# Patient Record
Sex: MALE | Race: BLACK OR AFRICAN AMERICAN
[De-identification: names, ages, dates, MRNs, and addresses within clinical notes are randomized per-mention and may not be internally consistent; named-entity substitution may affect disease eponyms.]

---

## 2017-02-06 ENCOUNTER — HOSPITAL ENCOUNTER (EMERGENCY)
Dept: HOSPITAL 74 - JER | Age: 22
Discharge: HOME | End: 2017-02-06
Payer: COMMERCIAL

## 2017-02-06 VITALS — TEMPERATURE: 97.9 F

## 2017-02-06 VITALS — SYSTOLIC BLOOD PRESSURE: 132 MMHG | HEART RATE: 58 BPM | DIASTOLIC BLOOD PRESSURE: 75 MMHG

## 2017-02-06 VITALS — BODY MASS INDEX: 16.7 KG/M2

## 2017-02-06 DIAGNOSIS — K52.9: Primary | ICD-10-CM

## 2017-02-06 LAB
ALBUMIN SERPL-MCNC: 4.5 G/DL (ref 3.4–5)
ALP SERPL-CCNC: 58 U/L (ref 45–117)
ALT SERPL-CCNC: 21 U/L (ref 12–78)
ANION GAP SERPL CALC-SCNC: 12 MMOL/L (ref 8–16)
AST SERPL-CCNC: 16 U/L (ref 15–37)
BASOPHILS # BLD: 0.7 % (ref 0–2)
BILIRUB SERPL-MCNC: 0.5 MG/DL (ref 0.2–1)
CALCIUM SERPL-MCNC: 9.6 MG/DL (ref 8.5–10.1)
CO2 SERPL-SCNC: 27 MMOL/L (ref 21–32)
CREAT SERPL-MCNC: 0.9 MG/DL (ref 0.7–1.3)
DEPRECATED RDW RBC AUTO: 13.9 % (ref 11.9–15.9)
EOSINOPHIL # BLD: 0.3 % (ref 0–4.5)
GLUCOSE SERPL-MCNC: 97 MG/DL (ref 74–106)
MCH RBC QN AUTO: 30.3 PG (ref 25.7–33.7)
MCHC RBC AUTO-ENTMCNC: 33.6 G/DL (ref 32–35.9)
MCV RBC: 90.3 FL (ref 80–96)
NEUTROPHILS # BLD: 85 % (ref 42.8–82.8)
PLATELET # BLD AUTO: 187 K/MM3 (ref 134–434)
PMV BLD: 10.8 FL (ref 7.5–11.1)
PROT SERPL-MCNC: 7.7 G/DL (ref 6.4–8.2)
WBC # BLD AUTO: 10 K/MM3 (ref 4–10)

## 2017-02-06 PROCEDURE — 3E0337Z INTRODUCTION OF ELECTROLYTIC AND WATER BALANCE SUBSTANCE INTO PERIPHERAL VEIN, PERCUTANEOUS APPROACH: ICD-10-PCS

## 2017-02-06 PROCEDURE — 3E033GC INTRODUCTION OF OTHER THERAPEUTIC SUBSTANCE INTO PERIPHERAL VEIN, PERCUTANEOUS APPROACH: ICD-10-PCS

## 2017-02-06 NOTE — PDOC
History of Present Illness





- History of Present Illness


Initial Comments: 





02/06/17 18:39


The patient is a 22 year old male, with a significant past medical history of 

asthma, who presents to the emergency department with nausea, vomiting, and 

diarrhea since this morning. The patient reports over 10 episodes of vomit 

today. He denies noticing blood in his vomit. He also reports at least 5 

episodes of watery, brown diarrhea. He denies any sick contacts. 





He denies chest pain, shortness of breath, headache and dizziness. He denies 

fever, chills, and constipation. He denies dysuria, frequency, urgency and 

hematuria. 





Allergies: NKDA


Past surgical history: appendectomy (2016), benign brain tumor removal (2008)


Social history: denies toxic habits


PCP - Dr. Christina Rojas





<Sade Chandra - Last Filed: 02/06/17 18:47>





<Latha Cristobal - Last Filed: 02/06/17 20:46>





- General


Chief Complaint: Pain, Acute


Stated Complaint: NAUSEA/VOMITING


Time Seen by Provider: 02/06/17 18:01





Past History





<Sade Chandra - Last Filed: 02/06/17 18:47>





- Past Medical History


Asthma: Yes


Thyroid Disease: No





- Surgical History


Appendectomy: Yes





- Psycho/Social/Smoking Cessation Hx


Anxiety: No


Suicidal Ideation: No


Smoking History: Never smoked


Have you smoked in the past 12 months: No


Hx Alcohol Use: No


Drug/Substance Use Hx: No


Substance Use Type: None





<Latha Cristobal - Last Filed: 02/06/17 20:46>





- Past Medical History


Allergies/Adverse Reactions: 


 Allergies











Allergy/AdvReac Type Severity Reaction Status Date / Time


 


cefaclor [From [facility]] Allergy   Verified 02/06/17 17:40











Home Medications: 


Ambulatory Orders





NK [No Known Home Medication]  02/06/17 











**Review of Systems





- Review of Systems


Able to Perform ROS?: Yes


Comments:: 





02/06/17 18:39





CONSTITUTIONAL: 


Absent: fever, chills, diaphoresis, generalized weakness, malaise, loss of 

appetite


HEENT: 


Absent: rhinorrhea, nasal congestion, throat pain, throat swelling, difficulty 

swallowing, mouth swelling, ear pain, eye pain, visual Changes


CARDIOVASCULAR: 


Absent: chest pain, syncope, palpitations, irregular heart rate, lightheadedness

, peripheral edema


RESPIRATORY: 


Absent: cough, shortness of breath, dyspnea with exertion, orthopnea, wheezing, 

stridor, hemoptysis


GASTROINTESTINAL:


(+) abdominal pain, nausea, vomiting, diarrhea. Absent: abdominal distension, 

constipation, melena, hematochezia


GENITOURINARY: 


Absent: dysuria, frequency, urgency, hesitancy, hematuria, flank pain, genital 

pain


MUSCULOSKELETAL: 


Absent: myalgia, arthralgia, joint swelling


SKIN: 


Absent: rash, itching, pallor


HEMATOLOGIC/IMMUNOLOGIC: 


Absent: easy bleeding, easy bruising, lymphadenopathy, frequent infections


ENDOCRINE:


Absent: unexplained weight gain, unexplained weight loss, heat intolerance, 

cold intolerance


NEUROLOGIC: 


Absent: headache, focal weakness or paresthesias, dizziness, unsteady gait, 

seizure, mental


status changes, bladder or bowel incontinence


PSYCHIATRIC: 


Absent: anxiety, depression, suicidal or homicidal ideation, hallucinations.








<Sade Chandra - Last Filed: 02/06/17 18:47>





*Physical Exam





- Vital Signs


 Last Vital Signs











Temp Pulse Resp BP Pulse Ox


 


 97.9 F   43 L  16   136/77   98 


 


 02/06/17 17:35  02/06/17 17:35  02/06/17 17:35  02/06/17 17:35  02/06/17 17:35














- Physical Exam


Comments: 


02/06/17 18:40





GENERAL:


Well developed, well nourished. Awake and alert. No acute distress.


HEENT:


(+) Dry mucous membranes. Normocephalic, atraumatic. PERRLA, EOMI. No 

conjunctival pallor. Sclera are non-icteric. Oropharynx is clear.


NECK: 


Supple. Full ROM. No JVD. Carotid pulses 2+ and symmetric, without bruits. No 

thyromegaly. No lymphadenopathy.


CARDIOVASCULAR:


(+) slightly tachycardic rate and normal rhythm. No murmurs, rubs, or gallops. 

Distal pulses are 2+ and symmetric. 


PULMONARY: 


No evidence of respiratory distress. Lungs clear to auscultation bilaterally. 

No wheezing, rales or rhonchi.


ABDOMINAL:


Non-tender. Soft. Non-distended. No rebound or guarding. No organomegaly. 

Normoactive bowel sounds. 


MUSCULOSKELETAL 


Normal range of motion at all joints. No bony deformities or tenderness. No CVA 

tenderness.


EXTREMITIES: 


No cyanosis. No clubbing. No edema. No calf tenderness.


SKIN: 


Warm and dry. Normal capillary refill. No rashes. No jaundice. 


NEUROLOGICAL: 


Alert, awake, appropriate. Cranial nerves 2-12 intact. Normoreflexic in the 

upper and lower extremities. Normal speech. Toes are down-going bilaterally. 

Gait is normal without ataxia.


PSYCHIATRIC: 


Cooperative. Good eye contact. Appropriate mood and affect.








<Sade Chandra - Last Filed: 02/06/17 18:47>





- Vital Signs


 Last Vital Signs











Temp Pulse Resp BP Pulse Ox


 


 97.9 F   43 L  16   136/77   98 


 


 02/06/17 17:35  02/06/17 17:35  02/06/17 17:35  02/06/17 17:35  02/06/17 17:35














<Latha Cristobal - Last Filed: 02/06/17 20:46>





ED Treatment Course





- LABORATORY


CBC & Chemistry Diagram: 


 02/06/17 18:50





 02/06/17 18:50





<Latha Cristobal - Last Filed: 02/06/17 20:46>





*DC/Admit/Observation/Transfer





- Attestations


Scribe Attestion: 





02/06/17 18:40





Documentation prepared by Sade Chandra, acting as medical scribe for Latha Cristobal MD





<Sade Chandra - Last Filed: 02/06/17 18:47>





<Latha Cristobal - Last Filed: 02/06/17 20:46>


Diagnosis at time of Disposition: 


 Gastroenteritis





- Discharge Dispostion


Disposition: HOME


Condition at time of disposition: Stable





- Referrals


Referrals: 


Christina Rojas MD [Primary Care Provider] - 





- Patient Instructions


Printed Discharge Instructions:  DI for Viral Gastroenteritis -- Adult


Additional Instructions: 


please advance your diet as tolerated


return for worsening symptom or high fever

## 2017-11-24 ENCOUNTER — HOSPITAL ENCOUNTER (OUTPATIENT)
Dept: HOSPITAL 74 - JER | Age: 22
Setting detail: OBSERVATION
LOS: 1 days | Discharge: LEFT BEFORE DISCHARGE | End: 2017-11-25
Attending: NURSE PRACTITIONER | Admitting: HOSPITALIST
Payer: COMMERCIAL

## 2017-11-24 VITALS — BODY MASS INDEX: 17.4 KG/M2

## 2017-11-24 DIAGNOSIS — Z88.8: ICD-10-CM

## 2017-11-24 DIAGNOSIS — R11.2: Primary | ICD-10-CM

## 2017-11-24 LAB
ALBUMIN SERPL-MCNC: 4.1 G/DL (ref 3.4–5)
ALP SERPL-CCNC: 58 U/L (ref 45–117)
ALT SERPL-CCNC: 22 U/L (ref 12–78)
ANION GAP SERPL CALC-SCNC: 7 MMOL/L (ref 8–16)
AST SERPL-CCNC: 13 U/L (ref 15–37)
BASOPHILS # BLD: 0.2 % (ref 0–2)
BILIRUB SERPL-MCNC: 0.4 MG/DL (ref 0.2–1)
CALCIUM SERPL-MCNC: 8.4 MG/DL (ref 8.5–10.1)
CO2 SERPL-SCNC: 25 MMOL/L (ref 21–32)
CREAT SERPL-MCNC: 0.9 MG/DL (ref 0.7–1.3)
DEPRECATED RDW RBC AUTO: 13.6 % (ref 11.9–15.9)
EOSINOPHIL # BLD: 0.1 % (ref 0–4.5)
GLUCOSE SERPL-MCNC: 97 MG/DL (ref 74–106)
MCH RBC QN AUTO: 30.7 PG (ref 25.7–33.7)
MCHC RBC AUTO-ENTMCNC: 33.7 G/DL (ref 32–35.9)
MCV RBC: 91.2 FL (ref 80–96)
NEUTROPHILS # BLD: 87.8 % (ref 42.8–82.8)
PLATELET # BLD AUTO: 180 K/MM3 (ref 134–434)
PMV BLD: 10 FL (ref 7.5–11.1)
PROT SERPL-MCNC: 7 G/DL (ref 6.4–8.2)
WBC # BLD AUTO: 12 K/MM3 (ref 4–10)

## 2017-11-24 PROCEDURE — G0378 HOSPITAL OBSERVATION PER HR: HCPCS

## 2017-11-24 PROCEDURE — 3E0337Z INTRODUCTION OF ELECTROLYTIC AND WATER BALANCE SUBSTANCE INTO PERIPHERAL VEIN, PERCUTANEOUS APPROACH: ICD-10-PCS | Performed by: NURSE PRACTITIONER

## 2017-11-24 PROCEDURE — 3E033GC INTRODUCTION OF OTHER THERAPEUTIC SUBSTANCE INTO PERIPHERAL VEIN, PERCUTANEOUS APPROACH: ICD-10-PCS | Performed by: NURSE PRACTITIONER

## 2017-11-24 NOTE — PN
Teaching Attending Note


Name of Resident: Ila Vazquez





ATTENDING PHYSICIAN STATEMENT





I saw and evaluated the patient.


I reviewed the resident's note and discussed the case with the resident.


I agree with the resident's findings and plan as documented.








SUBJECTIVE:








OBJECTIVE:








ASSESSMENT AND PLAN:


this is a 22 yr old male without any medical hx presented intractable vomiting, 

without any fever, chills. the patient stated he was in a good state of health 

until this morning 








plan:


admit the patient to obs 


ivf hydration 


anti-emitics


monitor QTC while on antiemetic

## 2017-11-24 NOTE — PDOC
Attending Attestation





- Resident


Resident Name: Tae Shin





- ED Attending Attestation


I have performed the following: I have examined & evaluated the patient, The 

case was reviewed & discussed with the resident, I agree w/resident's findings 

& plan





- HPI


HPI: 





11/24/17 16:40


21-year-old male with a history of recurrent vomiting over the past year.  

Patient states he smokes marijuana daily.  He now comes in complaining of 10 

episodes of nonbilious, nonbloody vomiting today and 2 episodes of minor 

diarrhea.  He denies any abdominal pain.  He denies any fever.





- Physicial Exam


PE: 





11/24/17 16:41


On examination, the patient complains of nausea.  His abdomen is soft, nontender

, without guarding or rebound.  There is no flank tenderness.  Vital signs are 

normal with the exception of bradycardia, which is chronic for him based on 

prior visits.





- Medical Decision Making





11/24/17 16:41


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  

Rule out other pathology.  Laboratory studies ordered.  Symptomatic therapy 

with antiemetics and fluids ordered.  Patient will be reevaluated post lab 

results with repeat abdominal examination.

## 2017-11-24 NOTE — PDOC
History of Present Illness





- General


Chief Complaint: Vomiting/Diarrhea


Stated Complaint: VOMITING


Time Seen by Provider: 11/24/17 14:27





- History of Present Illness


Initial Comments: 





11/24/17 15:08


21 yo M with h/o recurrent emesis who presents with vomitting. Patient reports 

acute onset of N/V this AM with 10 + episode of non biliary non bloody emesis 

this AM. Also endorses 3 + episodes of loose watery stools this AM with no 

blood or dark stools visualized. No abdominal pain, postprandial pain, but 

endorses decreased appetite.Symptoms nor relived with hot showers. Denies fevers

/chills dysuria, hematuria, abdominal pain, chest pain, SOB, lightheadedness, 

LOC. Endorses recreational marijuana use. Denies other illicit drug use. Denies 

alcohol use. Denies recent travel, hiking, camping, or recent sick contacts. 

Denies illicit drug use. H/o prior ED visits for N/V, Abdominal pain. States 

that he has been seen by GI in past.











Past History





- Past Medical History


Allergies/Adverse Reactions: 


 Allergies











Allergy/AdvReac Type Severity Reaction Status Date / Time


 


cefaclor [From [facility]] Allergy   Verified 02/06/17 17:40











Home Medications: 


Ambulatory Orders





NK [No Known Home Medication]  02/06/17 








Asthma: Yes


Thyroid Disease: No





- Surgical History


Appendectomy: Yes





- Suicide/Smoking/Psychosocial Hx


Smoking History: Never smoked


Have you smoked in the past 12 months: No


Information on smoking cessation initiated: No


Hx Alcohol Use: No


Drug/Substance Use Hx: No


Substance Use Type: None





**Review of Systems





- Review of Systems


Comments:: 





11/24/17 15:11


GENERAL/CONSTITUTIONAL: No fever or chills. No weakness.


HEAD, EYES, EARS, NOSE AND THROAT: No change in vision. No ear pain or 

discharge. No sore throat.-


CARDIOVASCULAR: No chest pain or shortness of breath


RESPIRATORY: No cough, wheezing, or hemoptysis.


GASTROINTESTINAL: +  nausea, vomiting, and diarrhea. No constipation.


GENITOURINARY: No dysuria, frequency, or change in urination.


MUSCULOSKELETAL: No joint or muscle swelling or pain. No neck or back pain.


SKIN: No rash


NEUROLOGIC: No headache, vertigo, loss of consciousness, or change in strength/

sensation.


ENDOCRINE: No increased thirst. No abnormal weight change


HEMATOLOGIC/LYMPHATIC: No anemia, easy bleeding, or history of blood clots.


ALLERGIC/IMMUNOLOGIC: No hives or skin allergy.











*Physical Exam





- Vital Signs


 Last Vital Signs











Temp Pulse Resp BP Pulse Ox


 


 98.3 F   45 L  20   134/74   100 


 


 11/24/17 13:45  11/24/17 13:45  11/24/17 13:45  11/24/17 13:45  11/24/17 13:45














- Physical Exam


Comments: 





11/24/17 15:12


GENERAL: Awake, alert, and fully oriented, in no acute distress


HEAD: No signs of trauma, normocephalic, atraumatic 


EYES: PERRLA, EOMI, sclera anicteric, conjunctiva clear


ENT: Auricles normal inspection, hearing grossly normal, nares patent, 

oropharynx clear without


exudates. Moist mucosa


NECK: Normal ROM, supple, no lymphadenopathy, JVD, or masses


LUNGS: No distress, speaks full sentences, clear to auscultation bilaterally 


HEART: Regular rate and rhythm, normal S1 and S2, no murmurs, rubs or gallops, 

peripheral pulses normal and equal bilaterally. 


ABDOMEN: Soft, nontender, normoactive bowel sounds.  No guarding, no rebound.  

No masses. Neg CVA ttp. Neg suprapubic ttp.


EXTREMITIES : Normal inspection, Normal range of motion, no edema.  No clubbing 

or cyanosis. 


SKIN: Warm, Dry, normal turgor, no rashes or lesions noted. 











ED Treatment Course





- LABORATORY


CBC & Chemistry Diagram: 


 11/24/17 16:40





 11/24/17 16:40





Medical Decision Making





- Medical Decision Making





11/24/17 16:18


21 yo M with h/o recurrent emesis who presents with acute onset of N/V this AM 

with 10 + episode of non biliary non bloody emesis this AM. Also endorses 3 + 

episodes of loose watery stools this AM with no blood or dark stools 

visualized. No abdominal pain, postprandial pain, but endorses decreased 

appetite. Denies fevers/chills dysuria, hematuria, abdominal pain, chest pain, 

SOB, lightheadedness, LOC. Physical exam benign with mild bradycardia ~P 47. 

Endorses recreational marijuana use. Will obtain basic labs to assess for 

electrolyte imbalance and infectious etiology or GI related cause to illness. S/

s most likely 2/2 to marijuana induced cyclical vomiting. There is low 

suspicion for more serious pathology given benign physical exam, absence of 

abdominal pain. Do not suspect PUD, mesenteric ischemia, IBD. 





ED Course: 


CBC, CMP, Lipase, UA, EKG, urine tox


11/24/17 16:32


NS 1 L, Regaln 10 mg, Zofran 4 mg, Diphendhyrdamie 25 mg. 





11/24/17 18:25


Pt. no longer vomiting, but will PO challenge.   





11/24/17 19:00


Continues to vomit.Will give compazine and microblog admitting hospitalist. 


11/24/17 20:11


Admit to Dr. Aguirre





*DC/Admit/Observation/Transfer


Diagnosis at time of Disposition: 


Nausea & vomiting


Qualifiers:


 Vomiting type: unspecified Vomiting Intractability: non-intractable Qualified 

Code(s): R11.2 - Nausea with vomiting, unspecified








- Discharge Dispostion


Admit: Yes





- Referrals





- Patient Instructions





- Post Discharge Activity

## 2017-11-24 NOTE — HP
CHIEF COMPLAINT: nausea, vomiting





PCP: none





HISTORY OF PRESENT ILLNESS:


22 yr old with pmhx of "chrondroma" presents with nausea and continuous 

vomiting since 5am this morning associated with diffuse abdominal pain. He at 

Thanksgiving dinner Thursday night without difficulty, including ham, potatoes, 

dairy products. denies raw or new food. also has 3 nonbloody watery bowel 

movements early this morning with no repeat BMs. abdominal pain is worse with 

palpation, not related to movement, continuous, nonradiation.


No others family members with similar symptoms. 


He completed 7 day course of amoxicillin 2wks ago for tooth infection.





ER course was notable for:


(1) reglan + compezine


(2) LUQ sono without gallstones/cbd








Recent Travel: spent one week in [place] two months ago, denies any bug bites, raw/

new foods.





PAST MEDICAL HISTORY:


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

, was followed with serial head CT's until 2 years ago when he was cleared and 

told no further follow-up was necessary


as per chart patient review patient had an appendectomy in 4/2016 at [facility]





PAST SURGICAL HISTORY:


"transphenoidal" surgery at age 15





Social History:


Smoking: 3-4 cigs/day occasionally since age 15


Alcohol: denies


Drugs: denied to this interviewer, however as per ED, he admits to smoking 

marijuana regularily and recently





Family History: paternal great-grandmother with unknown type of cancer


Allergies





cefaclor [From Ceclor] Allergy (Verified 02/06/17 17:40)


 








HOME MEDICATIONS:


 Home Medications











 Medication  Instructions  Recorded


 


NK [No Known Home Medication]  02/06/17








REVIEW OF SYSTEMS


CONSTITUTIONAL: 


Present: unintentional weight loss


Absent:  fever, chills, diaphoresis, generalized weakness, malaise, loss of 

appetite


HEENT: 


Absent:  rhinorrhea, nasal congestion, throat pain, throat swelling, difficulty 

swallowing, mouth swelling, ear pain, eye pain, visual changes


CARDIOVASCULAR: 


Absent: chest pain, syncope, palpitations, irregular heart rate, lightheadedness

, peripheral edema


RESPIRATORY: 


Absent: cough, shortness of breath, dyspnea with exertion, orthopnea, wheezing, 

stridor, hemoptysis


GASTROINTESTINAL:


Present: abdominal pain, nausea, vomiting, diarrhea,


Absent: abdominal distension,  constipation, melena, hematochezia


GENITOURINARY: 


Absent: dysuria, frequency, urgency, hesitancy, hematuria, flank pain, genital 

pain


MUSCULOSKELETAL: 


Absent: myalgia, arthralgia, joint swelling, back pain, neck pain


SKIN: 


Absent: rash, itching, pallor


HEMATOLOGIC/IMMUNOLOGIC: 


Absent: easy bleeding, easy bruising, lymphadenopathy, frequent infections


ENDOCRINE:


Absent: unexplained weight gain, unexplained weight loss, heat intolerance, 

cold intolerance


NEUROLOGIC: 


Absent: headache, focal weakness or paresthesias, dizziness, unsteady gait, 

seizure, mental status changes, bladder or bowel incontinence











PHYSICAL EXAMINATION


 Vital Signs - 24 hr











  11/24/17





  13:45


 


Temperature 98.3 F


 


Pulse Rate 45 L


 


Respiratory 20





Rate 


 


Blood Pressure 134/74


 


O2 Sat by Pulse 100





Oximetry (%) 











GENERAL: Awake, alert, and fully oriented, in no acute distress.


HEAD: Normal with no signs of trauma.


EYES: Pupils equal, round and reactive to light, extraocular movements intact, 

sclera anicteric, conjunctiva clear. No lid lag.


EARS, NOSE, THROAT: Ears normal, nares patent, oropharynx clear without 

exudates. Moist mucous membranes.


NECK: Normal range of motion, supple without lymphadenopathy, JVD, or masses.


LUNGS: Breath sounds equal, clear to auscultation bilaterally. No wheezes, and 

no crackles. No accessory muscle use.


HEART: Regular rate and rhythm, normal S1 and S2 without murmur, rub or gallop.


ABDOMEN: Soft, diffusely tender with light palpation, not distended, 

normoactive bowel sounds, +guarding, no masses. 


MUSCULOSKELETAL: Normal range of motion at all joints. No bony deformities or 

tenderness. No CVA tenderness.


UPPER EXTREMITIES: 2+ radial pulses, warm, well-perfused. No cyanosis. No 

clubbing. No peripheral edema.


LOWER EXTREMITIES: 2+ DP pulses, warm, well-perfused. No calf tenderness. No 

peripheral edema. 


NEUROLOGICAL:  Cranial nerves II-XII intact. Normal speech. facial symmetry, 5/

5 hand  and plantar flexion


PSYCHIATRIC: agitated, uncomfortable, 


SKIN: Warm, dry, normal turgor, no rashes or lesions noted, normal capillary 

refill. 


 Laboratory Results - last 24 hr











  11/24/17 11/24/17 11/24/17





  16:40 16:40 16:58


 


WBC  12.0 H  


 


RBC  4.43  


 


Hgb  13.6  


 


Hct  40.4  


 


MCV  91.2  


 


MCH  30.7  


 


MCHC  33.7  


 


RDW  13.6  


 


Plt Count  180  


 


MPV  10.0  


 


Neutrophils %  87.8 H  


 


Lymphocytes %  7.0 L D  


 


Monocytes %  4.9  


 


Eosinophils %  0.1  


 


Basophils %  0.2  


 


Sodium   139 


 


Potassium   4.1 


 


Chloride   107 


 


Carbon Dioxide   25 


 


Anion Gap   7 L 


 


BUN   11  D 


 


Creatinine   0.9 


 


Creat Clearance w eGFR   > 60 


 


Random Glucose   97 


 


Calcium   8.4 L 


 


Total Bilirubin   0.4 


 


AST   13 L 


 


ALT   22 


 


Alkaline Phosphatase   58 


 


Total Protein   7.0 


 


Albumin   4.1 


 


Lipase    123











ASSESSMENT/PLAN:


22 yr old man presents with intractable nausea and vomiting with several 

episodes in the ED placed on further evaluation.


as per chart review patient has been seen in ed for repeat episodes of cyclic 

vomiting, differential includes secondary to marijuana use - urine toxicology 

pending to r/o other substance use withdrawal as cause of vomiting, 

gastroenteritis. 





#vomiting


- npo while vomiting, clear liquid po challenge in the AM


- repeat EKG with receiving Qtc prolonging anti-emetics, 


- LUQ u/s negative for liver pathology, cholecystitis/cholelithiasis as cause


- IVF NS @150cc/hr


- repeat electrolytes to replete if decreased due to vomiting





#DVT - anticipate short stay, encourage ambulation, low risk


#Diet - npoo overnight with clear liquid challenge in the AM


#Dispo: cna likely discharge if vomiting resolves and patient can tolerate po








Visit type





- Emergency Visit


Emergency Visit: Yes


ED Registration Date: 11/24/17


Care time: The patient presented to the Emergency Department on the above date 

and was hospitalized for further evaluation of their emergent condition.





- New Patient


This patient is new to me today: Yes


Date on this admission: 11/24/17





- Critical Care


Critical Care patient: No

## 2017-11-25 VITALS — SYSTOLIC BLOOD PRESSURE: 113 MMHG | HEART RATE: 47 BPM | DIASTOLIC BLOOD PRESSURE: 48 MMHG | TEMPERATURE: 99.3 F

## 2017-11-25 LAB
ALBUMIN SERPL-MCNC: 3.6 G/DL (ref 3.4–5)
ALP SERPL-CCNC: 52 U/L (ref 45–117)
ALT SERPL-CCNC: 18 U/L (ref 12–78)
ANION GAP SERPL CALC-SCNC: 10 MMOL/L (ref 8–16)
APPEARANCE UR: CLEAR
AST SERPL-CCNC: 12 U/L (ref 15–37)
BASOPHILS # BLD: 0.4 % (ref 0–2)
BILIRUB SERPL-MCNC: 1.2 MG/DL (ref 0.2–1)
BILIRUB UR STRIP.AUTO-MCNC: NEGATIVE MG/DL
CALCIUM SERPL-MCNC: 8 MG/DL (ref 8.5–10.1)
CO2 SERPL-SCNC: 22 MMOL/L (ref 21–32)
COLOR UR: (no result)
CREAT SERPL-MCNC: 0.8 MG/DL (ref 0.7–1.3)
DEPRECATED RDW RBC AUTO: 13.7 % (ref 11.9–15.9)
EOSINOPHIL # BLD: 0.8 % (ref 0–4.5)
GLUCOSE SERPL-MCNC: 84 MG/DL (ref 74–106)
KETONES UR QL STRIP: (no result)
MAGNESIUM SERPL-MCNC: 1.8 MG/DL (ref 1.8–2.4)
MCH RBC QN AUTO: 30.7 PG (ref 25.7–33.7)
MCHC RBC AUTO-ENTMCNC: 33.7 G/DL (ref 32–35.9)
MCV RBC: 91.1 FL (ref 80–96)
NEUTROPHILS # BLD: 65.9 % (ref 42.8–82.8)
NITRITE UR QL STRIP: NEGATIVE
PH UR: 6 [PH] (ref 5–8)
PHOSPHATE SERPL-MCNC: 2.9 MG/DL (ref 2.5–4.9)
PLATELET # BLD AUTO: 180 K/MM3 (ref 134–434)
PMV BLD: 10 FL (ref 7.5–11.1)
PROT SERPL-MCNC: 6.3 G/DL (ref 6.4–8.2)
PROT UR QL STRIP: NEGATIVE
PROT UR QL STRIP: NEGATIVE
RBC # UR STRIP: NEGATIVE /UL
SP GR UR: 1.01 (ref 1–1.03)
URINE MARIJUANA THC: POSITIVE NG/ML
UROBILINOGEN UR STRIP-MCNC: NEGATIVE MG/DL (ref 0.2–1)
WBC # BLD AUTO: 12.1 K/MM3 (ref 4–10)

## 2017-11-25 NOTE — DS
Physical Examination


Vital Signs: 


 Vital Signs











Temperature  99.3 F   11/25/17 02:02


 


Pulse Rate  47 L  11/25/17 02:02


 


Respiratory Rate  20   11/25/17 02:02


 


Blood Pressure  113/48   11/25/17 02:02


 


O2 Sat by Pulse Oximetry (%)  97   11/25/17 04:09











Labs: 


 CBC, BMP





 11/25/17 06:38 











Discharge Summary


Reason For Visit: VOMITING





- Instructions


Disposition: AGAINST MEDICAL ADVICE





- Home Medications


Comprehensive Discharge Medication List: 


Ambulatory Orders





NK [No Known Home Medication]  02/06/17

## 2017-11-26 ENCOUNTER — HOSPITAL ENCOUNTER (EMERGENCY)
Dept: HOSPITAL 74 - JER | Age: 22
Discharge: HOME | End: 2017-11-26
Payer: COMMERCIAL

## 2017-11-26 VITALS — SYSTOLIC BLOOD PRESSURE: 128 MMHG | DIASTOLIC BLOOD PRESSURE: 63 MMHG | HEART RATE: 51 BPM

## 2017-11-26 VITALS — TEMPERATURE: 98.2 F

## 2017-11-26 VITALS — BODY MASS INDEX: 17.9 KG/M2

## 2017-11-26 DIAGNOSIS — G43.A0: Primary | ICD-10-CM

## 2017-11-26 LAB
ALBUMIN SERPL-MCNC: 4.1 G/DL (ref 3.4–5)
ALP SERPL-CCNC: 57 U/L (ref 45–117)
ALT SERPL-CCNC: 21 U/L (ref 12–78)
ANION GAP SERPL CALC-SCNC: 7 MMOL/L (ref 8–16)
AST SERPL-CCNC: 9 U/L (ref 15–37)
BASOPHILS # BLD: 0.3 % (ref 0–2)
BILIRUB SERPL-MCNC: 0.3 MG/DL (ref 0.2–1)
CALCIUM SERPL-MCNC: 8.6 MG/DL (ref 8.5–10.1)
CO2 SERPL-SCNC: 27 MMOL/L (ref 21–32)
CREAT SERPL-MCNC: 0.9 MG/DL (ref 0.7–1.3)
DEPRECATED RDW RBC AUTO: 13.7 % (ref 11.9–15.9)
EOSINOPHIL # BLD: 1.5 % (ref 0–4.5)
GLUCOSE SERPL-MCNC: 88 MG/DL (ref 74–106)
MAGNESIUM SERPL-MCNC: 2.1 MG/DL (ref 1.8–2.4)
MCH RBC QN AUTO: 30.5 PG (ref 25.7–33.7)
MCHC RBC AUTO-ENTMCNC: 33.2 G/DL (ref 32–35.9)
MCV RBC: 91.9 FL (ref 80–96)
NEUTROPHILS # BLD: 73.7 % (ref 42.8–82.8)
PHOSPHATE SERPL-MCNC: 2.6 MG/DL (ref 2.5–4.9)
PLATELET # BLD AUTO: 188 K/MM3 (ref 134–434)
PMV BLD: 9.9 FL (ref 7.5–11.1)
PROT SERPL-MCNC: 7.1 G/DL (ref 6.4–8.2)
WBC # BLD AUTO: 11 K/MM3 (ref 4–10)

## 2017-11-26 PROCEDURE — 3E0337Z INTRODUCTION OF ELECTROLYTIC AND WATER BALANCE SUBSTANCE INTO PERIPHERAL VEIN, PERCUTANEOUS APPROACH: ICD-10-PCS

## 2017-11-26 PROCEDURE — 3E033NZ INTRODUCTION OF ANALGESICS, HYPNOTICS, SEDATIVES INTO PERIPHERAL VEIN, PERCUTANEOUS APPROACH: ICD-10-PCS

## 2017-11-26 PROCEDURE — 3E033GC INTRODUCTION OF OTHER THERAPEUTIC SUBSTANCE INTO PERIPHERAL VEIN, PERCUTANEOUS APPROACH: ICD-10-PCS

## 2017-11-26 NOTE — PDOC
Attending Attestation





- Resident


Resident Name: Luz Maria Monreal





- ED Attending Attestation


I have performed the following: I have examined & evaluated the patient, The 

case was reviewed & discussed with the resident, I agree w/resident's findings 

& plan, Exceptions are as noted





- HPI


HPI: 





11/26/17 14:41


23yo M hx of appendectomy, recurrent emesis, recently left AMA yesterday for 

the same p/w multiple episoides of NBNB emesis a/w non bloody, watery diarrhea 

and diffuse abd pain since this morning. Pt had similar sxs Friday into 

Saturday morning and left AMA after feeling better. Utox from yesterday 

positive for THC, pt reports smoking marijuana a few days ago. Denies headache, 

CP, SOB, weakness, dizziness, LE edema. 








- Physicial Exam


PE: 





11/26/17 14:52


GENERAL: Awake, alert, and fully oriented, appears uncomfortable


HEAD: No signs of trauma


EYES: PERRLA, EOMI, sclera anicteric, conjunctiva clear


ENT: Auricles normal inspection, hearing grossly normal, nares patent, 

oropharynx clear without exudates. Moist mucosa


NECK: Normal ROM, supple, no lymphadenopathy, JVD, or masses


LUNGS: Breath sounds equal, clear to auscultation bilaterally.  No wheezes, and 

no crackles


HEART: Regular rate and rhythm, normal S1 and S2, no murmurs, rubs or gallops


ABDOMEN: diffusely tender, worse in the epigastric area. no rebound or 

guarding. No distention


EXTREMITIES: Normal range of motion, no edema.  No clubbing or cyanosis. No 

cords, erythema, or tenderness


NEUROLOGICAL:  Normal speech, cranial nerves intact, negative pronator drift, 5/

5 strength in all 4 extremities, normal sensation to light touch in all 4 

extremities, normal cerebellar exam, normal gait, normal reflexes and tone


SKIN: Warm, Dry, normal turgor, no rashes or lesions noted.





- Medical Decision Making





11/26/17 15:01


22-year-old male with a history of recurrent emesis presents with recurrence of 

nausea, vomiting and diarrhea. Vitals are unremarkable. Exam with diffuse 

abdominal tenderness and epigastric pain. The patient reports having multiple 

CT scans for similar symptoms in the past and that they "never show anything." 

Differential includes but not limited to cyclical vomiting syndrome versus 

gastroenteritis versus colitis versus pancreatitis. Will check labs and provide 

symptomatic control. Will hold off on imaging and do serial abdominal exams 

given multiple negative CT scans in the past for similar sxs. If continues to 

be tender, will consider imaging.

## 2017-11-26 NOTE — PDOC
History of Present Illness





- General


Chief Complaint: Nausea/Vomiting


Stated Complaint: NAUSEA/VOMITING


Time Seen by Provider: 11/26/17 12:09





- History of Present Illness


Initial Comments: 





11/26/17 14:18


23yo man with h/o recurrent emesis who presents today with nausea, continuous 

NBNB emesis associated with diffuse abdominal pain and 1xepisode of non-bloody, 

watery diarrhea.  He was here on Friday for similar presentation, admitted to 

Hospitalist service, but left AMA on Saturday when symptoms improved. [facility]Q 

ultrasound (11/24) found no acute pathology (no cholethiliasis or dilated CBD). 

He returned this morning when started to have nausea and several NBNB emesis. 

He denied drug use to this interviewer, but has admitted recreational marijuana 

use to other interviewers. Urine Tox (11/25/17) was +THC. No fever or chills. 

No others family members with similar symptoms. 





Allergies: NKDA


Past surgical history: appendectomy (2016), benign brain tumor removal (2008)


Social history: denies toxic habits


PCP - Dr. Christina Rojas





11/26/17 21:41








Past History





- Past Medical History


Allergies/Adverse Reactions: 


 Allergies











Allergy/AdvReac Type Severity Reaction Status Date / Time


 


cefaclor [From [facility]] Allergy   Verified 11/26/17 12:06











Home Medications: 


Ambulatory Orders





Ondansetron [Zofran Odt -] 4 mg SL TID PRN #21 od.tablet 11/26/17 








Asthma: Yes


COPD: No


Thyroid Disease: No


Other medical history: brain tumor





- Surgical History


Appendectomy: Yes


Neurologic Surgery: Yes (Tumor Removal 2008)





- Suicide/Smoking/Psychosocial Hx


Smoking History: Never smoked


Have you smoked in the past 12 months: No


Hx Alcohol Use: No


Drug/Substance Use Hx: No


Substance Use Type: Marijuana





*Physical Exam





- Vital Signs


 Last Vital Signs











Temp Pulse Resp BP Pulse Ox


 


 99.3 F   78   18   130/60   98 


 


 11/26/17 12:04  11/26/17 12:04  11/26/17 12:04  11/26/17 12:04  11/26/17 12:04














- Physical Exam


General Appearance: Yes: Nourished, Appropriately Dressed


HEENT: positive: EOMI, STACIE, Normal ENT Inspection


Respiratory/Chest: positive: Lungs Clear, Normal Breath Sounds


Cardiovascular: positive: Regular Rhythm, Regular Rate, S1, S2


Gastrointestinal/Abdominal: positive: Soft, Other ((+)mild epigastric tenderness

)


Musculoskeletal: negative: CVA Tenderness


Extremity: positive: Normal Inspection


Integumentary: positive: Normal Color


Neurologic: positive: Fully Oriented, Alert





**Heart Score/ECG Review





- ECG Impressions


Comment:: 





11/26/17 21:24


Sinus bradycardia, rate 46, normal axis, non-specific ST elevations unchanged 

from prior 4/14/16, QTc 372.


11/26/17 22:40








ED Treatment Course





- LABORATORY


CBC & Chemistry Diagram: 


 11/26/17 12:28





 11/26/17 12:28





Medical Decision Making





- Medical Decision Making





11/26/17 15:21


23yo man with recurrent emesis, s/p appendectomy 2016, who presents with 

continuous NBNB emesis, 1x watery, non-bloody diarrhea, and diffuse abdominal 

pain. Abd/pelvis CT here in 2016 was negative for acute pathology. Ddx includues

, but not limited to cyclic vomiting syndrome 2/2 cannabis-use vs pancreatitis 

vs gastroenteritis vs colitis. 


Will treat symptomatically for now:


-IVF


-Reglan, Zofran


-IVF 1L





11/26/17 15:27


Lipase wnl. WBC of 11K. Will continue to monitor with serial abdominal exams.





11/26/17 17:17


Continues to have abdominal pain s/p Benadryl and 2mg Morphine IVP.





Will do abd/pelvic imaging to r/o acute intrabdominal pathology. Patient 

refused IV contrast because his "body reacts badly to contrast" and has vomited 

after receiving IV contrast. Patient made aware of limitations of not getting 

IV contrast.





11/26/17 21:06


As of around 5PM the patient has had no further episodes of emesis, but 

continues to c/o nausea and abdominal pain. However, on physical exam his 

abdomen is soft with only mild tenderness in epigastric region. Preliminary 

report of CT abd/pelvis is unrevealing with no e/o pancreatitis, SBO, or mass. 

Pancreatitis unlikely given normal lipase and imaging results. 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 





-Will give an 1L bolus and plan for discharge home. 





11/26/17 21:23








*DC/Admit/Observation/Transfer


Diagnosis at time of Disposition: 


 Cyclical vomiting syndrome








- Discharge Dispostion


Disposition: HOME


Condition at time of disposition: Stable


Admit: No





- Prescriptions


Prescriptions: 


Ondansetron [Zofran Odt -] 4 mg SL TID PRN #21 od.tablet


 PRN Reason: Nausea And/Or Vomiting





- Referrals


Referrals: 


Christina Rojas MD [Non Staff, Medical] - 





- Patient Instructions


Additional Instructions: 


Please follow-up with your primary care physician within 1-2 weeks. 





You may continue to have some nausea/vomiting for the next few days. Take 

Zofran as directed every 4-6 hours as needed for nausea and/or vomiting. 


Advance your diet as tolerated.





Please return to the emergency department if you have new, worsening, or 

concerning symptoms.





- Post Discharge Activity

## 2017-11-27 ENCOUNTER — HOSPITAL ENCOUNTER (INPATIENT)
Dept: HOSPITAL 74 - JER | Age: 22
LOS: 1 days | Discharge: LEFT BEFORE DISCHARGE | DRG: 241 | End: 2017-11-28
Attending: INTERNAL MEDICINE | Admitting: INTERNAL MEDICINE
Payer: COMMERCIAL

## 2017-11-27 VITALS — BODY MASS INDEX: 17.4 KG/M2

## 2017-11-27 DIAGNOSIS — R11.2: ICD-10-CM

## 2017-11-27 DIAGNOSIS — R10.84: ICD-10-CM

## 2017-11-27 DIAGNOSIS — R74.8: ICD-10-CM

## 2017-11-27 DIAGNOSIS — K29.80: ICD-10-CM

## 2017-11-27 DIAGNOSIS — K29.60: Primary | ICD-10-CM

## 2017-11-27 DIAGNOSIS — K83.8: ICD-10-CM

## 2017-11-27 DIAGNOSIS — G43.A0: ICD-10-CM

## 2017-11-27 LAB
ALBUMIN SERPL-MCNC: 4.2 G/DL (ref 3.4–5)
ALP SERPL-CCNC: 58 U/L (ref 45–117)
ALT SERPL-CCNC: 18 U/L (ref 12–78)
ANION GAP SERPL CALC-SCNC: 9 MMOL/L (ref 8–16)
APTT BLD: 34.7 SECONDS (ref 26.9–34.4)
AST SERPL-CCNC: 10 U/L (ref 15–37)
BASOPHILS # BLD: 0.3 % (ref 0–2)
BILIRUB SERPL-MCNC: 0.6 MG/DL (ref 0.2–1)
CALCIUM SERPL-MCNC: 9.4 MG/DL (ref 8.5–10.1)
CO2 SERPL-SCNC: 32 MMOL/L (ref 21–32)
CREAT SERPL-MCNC: 0.9 MG/DL (ref 0.7–1.3)
DEPRECATED RDW RBC AUTO: 13.6 % (ref 11.9–15.9)
EOSINOPHIL # BLD: 0.6 % (ref 0–4.5)
GLUCOSE SERPL-MCNC: 92 MG/DL (ref 74–106)
INR BLD: 1.27 (ref 0.82–1.09)
MAGNESIUM SERPL-MCNC: 2.1 MG/DL (ref 1.8–2.4)
MCH RBC QN AUTO: 30.4 PG (ref 25.7–33.7)
MCHC RBC AUTO-ENTMCNC: 33.8 G/DL (ref 32–35.9)
MCV RBC: 90.1 FL (ref 80–96)
NEUTROPHILS # BLD: 75.5 % (ref 42.8–82.8)
PHOSPHATE SERPL-MCNC: 2.9 MG/DL (ref 2.5–4.9)
PLATELET # BLD AUTO: 205 K/MM3 (ref 134–434)
PMV BLD: 9.7 FL (ref 7.5–11.1)
PROT SERPL-MCNC: 7.3 G/DL (ref 6.4–8.2)
PT PNL PPP: 14.3 SEC (ref 9.98–11.88)
WBC # BLD AUTO: 10.7 K/MM3 (ref 4–10)

## 2017-11-27 PROCEDURE — G0378 HOSPITAL OBSERVATION PER HR: HCPCS

## 2017-11-27 RX ADMIN — PANTOPRAZOLE SODIUM SCH MG: 40 INJECTION, POWDER, FOR SOLUTION INTRAVENOUS at 17:15

## 2017-11-27 RX ADMIN — SODIUM CHLORIDE SCH MLS/HR: 9 INJECTION, SOLUTION INTRAVENOUS at 17:15

## 2017-11-27 NOTE — CON.GI
Consult


Consult Specialty:: GI


Referred by:: ED





- History of Present Illness


History of Present Illness: 





A 22 yom with nausea, vomiting, epigastric pain and diarrhea since Friday. 

Denies eating out, ill, contacts, changed in diet, recent travels, camping, 

antibiotic use, new medications. Deneis fever, chills, joint, skin, eye 

symptoms. Deneis chronic ETOH, NSAIDs. Had similar episode 1 year ago and was 

diagnosed with acute appendicitis. Blood work on admission is significant for 

mild leukocytosis.





- Alcohol/Substance Use


Hx Alcohol Use: No





- Smoking History


Smoking history: Never smoked


Have you smoked in the past 12 months: No





Home Medications





- Allergies


Allergies/Adverse Reactions: 


 Allergies











Allergy/AdvReac Type Severity Reaction Status Date / Time


 


[facility] Allergy Severe "throat Verified 11/27/17 09:27





   swelling"  














- Home Medications


Home Medications: 


Ambulatory Orders





Ondansetron [Zofran Odt -] 4 mg SL TID PRN #21 od.tablet 11/26/17 











Family Disease History





- Family Disease History


Family History: Unremarkable (non-contributory)





Review of Systems


Findings/Remarks: 





please refer to H&P





Physical Exam-GI


Vital Signs: 


 Vital Signs











Temperature  98.4 F   11/27/17 09:27


 


Pulse Rate  79   11/27/17 13:35


 


Respiratory Rate  18   11/27/17 13:35


 


Blood Pressure  118/74   11/27/17 13:35


 


O2 Sat by Pulse Oximetry (%)  99   11/27/17 13:35











Constitutional: Yes: Well Nourished, No Distress, Calm


Eyes: Yes: Conjunctiva Clear


HENT: Yes: Atraumatic


Neck: Yes: Supple


Cardiovascular: Yes: Regular Rate and Rhythm


Respiratory: Yes: Regular


...Auscultate: Yes: Normoactive Bowel Sounds


...Palpate: Yes: Soft, Tenderness, Tenderness, Epigastium.  No: Mass


...Percussion: Yes: Other (negative frey's)


Neurological: Yes: Alert, Oriented


Labs: 


 CBC, BMP





 11/27/17 10:00 





 11/27/17 10:00 





 INR, PTT











INR  1.27  (0.82-1.09)  H  11/27/17  10:00    








 CBCD











WBC  10.7 K/mm3 (4.0-10.0)  H  11/27/17  10:00    


 


RBC  4.81 M/mm3 (4.00-5.60)   11/27/17  10:00    


 


Hgb  14.6 GM/dL (11.7-16.9)   11/27/17  10:00    


 


Hct  43.4 % (35.4-49)   11/27/17  10:00    


 


MCV  90.1 fl (80-96)   11/27/17  10:00    


 


MCHC  33.8 g/dl (32.0-35.9)   11/27/17  10:00    


 


RDW  13.6 % (11.9-15.9)   11/27/17  10:00    


 


Plt Count  205 K/MM3 (134-434)   11/27/17  10:00    


 


MPV  9.7 fl (7.5-11.1)   11/27/17  10:00    








 CMP











Sodium  139 mmol/L (136-145)   11/27/17  10:00    


 


Potassium  3.3 mmol/L (3.5-5.1)  L  11/27/17  10:00    


 


Chloride  98 mmol/L ()   11/27/17  10:00    


 


Carbon Dioxide  32 mmol/L (21-32)   11/27/17  10:00    


 


Anion Gap  9  (8-16)   11/27/17  10:00    


 


BUN  5 mg/dL (7-18)  L  11/27/17  10:00    


 


Creatinine  0.9 mg/dL (0.7-1.3)   11/27/17  10:00    


 


Creat Clearance w eGFR  > 60  (>60)   11/27/17  10:00    


 


Calcium  9.4 mg/dL (8.5-10.1)   11/27/17  10:00    


 


Total Bilirubin  0.6 mg/dL (0.2-1.0)  D 11/27/17  10:00    


 


AST  10 U/L (15-37)  L  11/27/17  10:00    


 


ALT  18 U/L (12-78)   11/27/17  10:00    


 


Alkaline Phosphatase  58 U/L ()   11/27/17  10:00    


 


Total Protein  7.3 g/dl (6.4-8.2)   11/27/17  10:00    


 


Albumin  4.2 g/dl (3.4-5.0)   11/27/17  10:00    








 Laboratory Results - last 24 hr











  11/27/17 11/27/17 11/27/17





  10:00 10:00 10:00


 


WBC  10.7 H  


 


RBC  4.81  


 


Hgb  14.6  


 


Hct  43.4  


 


MCV  90.1  


 


MCH  30.4  


 


MCHC  33.8  


 


RDW  13.6  


 


Plt Count  205  


 


MPV  9.7  


 


Neutrophils %  75.5  


 


Lymphocytes %  16.2  


 


Monocytes %  7.4  


 


Eosinophils %  0.6  


 


Basophils %  0.3  


 


PT with INR   14.30 H 


 


INR   1.27 H 


 


PTT (Actin FS)   34.7 H 


 


Sodium    139


 


Potassium    3.3 L


 


Chloride    98


 


Carbon Dioxide    32


 


Anion Gap    9


 


BUN    5 L


 


Creatinine    0.9


 


Creat Clearance w eGFR    > 60


 


Random Glucose    92


 


Calcium    9.4


 


Phosphorus    2.9


 


Magnesium    2.1


 


Total Bilirubin    0.6  D


 


AST    10 L


 


ALT    18


 


Alkaline Phosphatase    58


 


Total Protein    7.3


 


Albumin    4.2














Problem List





- Problems


(1) Abdominal pain


Code(s): R10.9 - UNSPECIFIED ABDOMINAL PAIN   


Qualifiers: 


   Abdominal location: generalized   Qualified Code(s): R10.84 - Generalized 

abdominal pain   





(2) Nausea & vomiting


Code(s): R11.2 - NAUSEA WITH VOMITING, UNSPECIFIED   


Qualifiers: 


   Vomiting type: unspecified   Vomiting Intractability: non-intractable   

Qualified Code(s): R11.2 - Nausea with vomiting, unspecified   





Assessment/Plan





Persisitent nausea, vomiting, diarrhea and epigastric pain in otherwise healthy 

22 yom. 





r/o upper GI inflammatory states (pud, gastritis, duodenies, esophagitis, 

pancreatitis, gallstone, etc.), 


Doubt, ischemia, obstruction, IBD. 


Infectious etiology is possible, however the duration and severity of the 

symptoms is not entirely consistent





NPO except medications


IVF


Stool for infectious etiologies


Lipase tonight


RUQ US


PPI IVP


Antiemetics and pain management PRN


EGD in  AM


Discussed with the patient

## 2017-11-27 NOTE — EKG
Test Reason : 

Blood Pressure : ***/*** mmHG

Vent. Rate : 046 BPM     Atrial Rate : 046 BPM

   P-R Int : 150 ms          QRS Dur : 098 ms

    QT Int : 426 ms       P-R-T Axes : 054 077 068 degrees

   QTc Int : 372 ms

 

SINUS BRADYCARDIA WITH SINUS ARRHYTHMIA

INCOMPLETE RIGHT BUNDLE BRANCH BLOCK

ST ELEVATION, CONSIDER EARLY REPOLARIZATION, PERICARDITIS, OR INJURY

ABNORMAL ECG

WHEN COMPARED WITH ECG OF 15-APR-2016 13:01,

T WAVE VARIATION

Confirmed by CHACORTA ARCE MD ([id number]) on 11/27/2017 2:07:35 PM

 

Referred By:             Confirmed By:CHACORTA ARCE MD

## 2017-11-27 NOTE — HP
Admitting History and Physical





- Primary Care Physician


PCP: Heena Martínez





- Admission


History of Present Illness: 





22 year old male, with significant past medical history of appendectomy (2016), 

who was recently admitted on 11/24/17 for recurrent nausea, vomiting, and 

diarrhea but left AMA when symptoms resolved. He was also seen in the ED on 11/ 26/17 with a markedly limited abdominal pelvic CT and was discharged with 

Zofran. The patient returns today with persistent episodes of nonbloody, 

nonbilious vomiting, diffuse abdominal pain, and loose watery stool. He states 

that he is unable to keep down the PO Zofran. The patient notes that he 

experienced similar symptoms last year around this time and was thought to have 

appendicitis; therefore, had an appendectomy. 








- Smoking History


Smoking history: Never smoked


Have you smoked in the past 12 months: No





- Alcohol/Substance Use


Hx Alcohol Use: No





Home Medications





- Allergies


Allergies/Adverse Reactions: 


 Allergies











Allergy/AdvReac Type Severity Reaction Status Date / Time


 


[facility] Allergy Severe "throat Verified 11/27/17 09:27





   swelling"  














- Home Medications


Home Medications: 


Ambulatory Orders





Ondansetron [Zofran Odt -] 4 mg SL TID PRN #21 od.tablet 11/26/17 











Physical Examination


Vital Signs: 


 Vital Signs











Temperature  98.4 F   11/27/17 09:27


 


Pulse Rate  79   11/27/17 13:35


 


Respiratory Rate  18   11/27/17 13:35


 


Blood Pressure  118/74   11/27/17 13:35


 


O2 Sat by Pulse Oximetry (%)  99   11/27/17 13:35











Constitutional: Yes: No Distress


HENT: Yes: Atraumatic


Neck: Yes: Supple


Cardiovascular: Yes: Regular Rate and Rhythm


Respiratory: Yes: CTA Bilaterally


Gastrointestinal: Yes: Normal Bowel Sounds


Extremities: Yes: WNL


Neurological: Yes: Alert, Oriented


Labs: 


 CBC, BMP





 11/27/17 10:00 





 11/27/17 10:00 











Problem List





- Problems


(1) Abdominal pain


Assessment/Plan: 


prn pain meds


prn zofran


gi consult


Code(s): R10.9 - UNSPECIFIED ABDOMINAL PAIN   


Qualifiers: 


   Abdominal location: generalized   Qualified Code(s): R10.84 - Generalized 

abdominal pain   





(2) Cyclical vomiting syndrome


Code(s): G43.A0 - CYCLICAL VOMITING, NOT INTRACTABLE   





(3) Nausea & vomiting


Code(s): R11.2 - NAUSEA WITH VOMITING, UNSPECIFIED   


Qualifiers: 


   Vomiting type: unspecified   Vomiting Intractability: non-intractable   

Qualified Code(s): R11.2 - Nausea with vomiting, unspecified   





Assessment/Plan





 Laboratory Tests











  11/27/17 11/27/17 11/27/17





  06:15 10:00 10:00


 


WBC   10.7 H 


 


RBC   4.81 


 


Hgb   14.6 


 


Hct   43.4 


 


MCV   90.1 


 


MCH   30.4 


 


MCHC   33.8 


 


RDW   13.6 


 


Plt Count   205 


 


MPV   9.7 


 


Neutrophils %   75.5 


 


Lymphocytes %   16.2 


 


Monocytes %   7.4 


 


Eosinophils %   0.6 


 


Basophils %   0.3 


 


PT with INR    14.30 H


 


INR    1.27 H


 


PTT (Actin FS)    34.7 H


 


Sodium   


 


Potassium   


 


Chloride   


 


Carbon Dioxide   


 


Anion Gap   


 


BUN   


 


Creatinine   


 


Creat Clearance w eGFR   


 


Random Glucose   


 


Calcium   


 


Phosphorus   


 


Magnesium   


 


Total Bilirubin   


 


AST   


 


ALT   


 


Alkaline Phosphatase   


 


Total Protein   


 


Albumin   


 


Lipase  376  














  11/27/17





  10:00


 


WBC 


 


RBC 


 


Hgb 


 


Hct 


 


MCV 


 


MCH 


 


MCHC 


 


RDW 


 


Plt Count 


 


MPV 


 


Neutrophils % 


 


Lymphocytes % 


 


Monocytes % 


 


Eosinophils % 


 


Basophils % 


 


PT with INR 


 


INR 


 


PTT (Actin FS) 


 


Sodium  139


 


Potassium  3.3 L


 


Chloride  98


 


Carbon Dioxide  32


 


Anion Gap  9


 


BUN  5 L


 


Creatinine  0.9


 


Creat Clearance w eGFR  > 60


 


Random Glucose  92


 


Calcium  9.4


 


Phosphorus  2.9


 


Magnesium  2.1


 


Total Bilirubin  0.6  D


 


AST  10 L


 


ALT  18


 


Alkaline Phosphatase  58


 


Total Protein  7.3


 


Albumin  4.2


 


Lipase 








Active Medications











Generic Name Dose Route Start Last Admin





  Trade Name Freq  PRN Reason Stop Dose Admin


 


Sodium Chloride  1,000 mls @ 100 mls/hr  11/27/17 17:15  11/27/17 17:15





  Normal Saline -  IV   100 mls/hr





  ASDIR ASHANTI   Administration


 


Morphine Sulfate  2 mg  11/27/17 16:57  11/27/17 17:15





  Morphine Sulfate  IVPUSH   2 mg





  Q4H PRN   Administration





  PAIN   


 


Ondansetron HCl  4 mg  11/27/17 16:43  





  Zofran Odt -  SL   





  Q6H PRN   





  NAUSEA AND/OR VOMITING   


 


Pantoprazole Sodium  40 mg  11/27/17 16:45  11/27/17 17:15





  Protonix Iv  IVPUSH   40 mg





  DAILY ASHANTI   Administration

## 2017-11-27 NOTE — PDOC
History of Present Illness





- History of Present Illness


Initial Comments: 





11/27/17 10:43


22 year old male, with significant past medical history of appendectomy (2016), 

who was recently admitted on 11/24/17 for recurrent nausea, vomiting, and 

diarrhea but left AMA when symptoms resolved. He was also seen in the ED on 11/ 26/17 with a markedly limited abdominal pelvic CT and was discharged with 

Zofran. The patient returns today with persistent episodes of nonbloody, 

nonbilious vomiting, diffuse abdominal pain, and loose watery stool. He states 

that he is unable to keep down the PO Zofran. The patient notes that he 

experienced similar symptoms last year around this time and was thought to have 

appendicitis; therefore, had an appendectomy. 





Denies fever, chills, nausea, vomiting. 


Denies dark, tarry, or bloody stools. 


Denies recent travel. 





Allergies: cefaclor











<Maria E Hammer - Last Filed: 11/27/17 11:39>





- General


History Source: Patient


Exam Limitations: No Limitations





<Ramon Hallman - Last Filed: 11/27/17 12:46>





- General


Chief Complaint: Pain


Stated Complaint: VOMITING


Time Seen by Provider: 11/27/17 09:29





Past History





<Maria E Hammer - Last Filed: 11/27/17 11:39>





- Past Medical History


Asthma: Yes


COPD: No


Thyroid Disease: No





- Surgical History


Abdominal Surgery: Yes


Appendectomy: Yes


Neurologic Surgery: Yes (Tumor Removal 2008)





- Suicide/Smoking/Psychosocial Hx


Smoking History: Never smoked


Have you smoked in the past 12 months: No


Hx Alcohol Use: No


Drug/Substance Use Hx: No


Substance Use Type: None





<Ramon Hallman - Last Filed: 11/27/17 12:46>





- Past Medical History


Allergies/Adverse Reactions: 


 Allergies











Allergy/AdvReac Type Severity Reaction Status Date / Time


 


cefaclor [From [facility]] Allergy Severe "throat Verified 11/27/17 09:27





   swelling"  











Home Medications: 


Ambulatory Orders





Ondansetron [Zofran Odt -] 4 mg SL TID PRN #21 od.tablet 11/26/17 











**Review of Systems





- Review of Systems


Able to Perform ROS?: Yes


Comments:: 





11/27/17 10:44


GENERAL/CONSTITUTIONAL: No fever or chills. No weakness.


HEAD, EYES, EARS, NOSE AND THROAT: No change in vision. No ear pain or 

discharge. No sore throat.


CARDIOVASCULAR: No chest pain or shortness of breath.


RESPIRATORY: No cough, wheezing, or hemoptysis.


GASTROINTESTINAL: +nausea, vomiting, loose stools, diffuse abdominal pain. 


GENITOURINARY: No dysuria, frequency, or change in urination.


MUSCULOSKELETAL: No joint or muscle swelling or pain. No neck or back pain.


SKIN: No rash


NEUROLOGIC: No headache, vertigo, loss of consciousness, or change in strength/

sensation.


ENDOCRINE: No increased thirst. No abnormal weight change.


HEMATOLOGIC/LYMPHATIC: No anemia, easy bleeding, or history of blood clots.


ALLERGIC/IMMUNOLOGIC: No hives or skin allergy.








<Maria E Hammer - Last Filed: 11/27/17 11:39>





*Physical Exam





- Vital Signs


 Last Vital Signs











Temp Pulse Resp BP Pulse Ox


 


 98.4 F   87   18   125/67   99 


 


 11/27/17 09:27  11/27/17 09:27  11/27/17 09:27  11/27/17 09:27  11/27/17 09:27














- Physical Exam


Comments: 





11/27/17 10:44


GENERAL: Awake, alert, and fully oriented, in no acute distress


HEAD: No signs of trauma


EYES: PERRLA, EOMI, sclera anicteric, conjunctiva clear


ENT: Auricles normal inspection, hearing grossly normal, nares patent, 

oropharynx clear without exudates. +dry mucous membranes


NECK: Normal ROM, supple, no lymphadenopathy, JVD, or masses


LUNGS: Breath sounds equal, clear to auscultation bilaterally.  No wheezes, and 

no crackles


HEART: Regular rate and rhythm, normal S1 and S2, no murmurs, rubs or gallops


ABDOMEN: +Diffuse abdominal tenderness. Soft,  normoactive bowel sounds.  No 

guarding, no rebound.  No masses


EXTREMITIES: Normal range of motion, no edema.  No clubbing or cyanosis. No 

cords, erythema, or tenderness


NEUROLOGICAL: Cranial nerves II through XII grossly intact.  Normal speech, 

normal gait


SKIN: Warm, Dry, normal turgor, no rashes or lesions noted.








<Maria E Hammer - Last Filed: 11/27/17 11:39>





- Vital Signs


 Last Vital Signs











Temp Pulse Resp BP Pulse Ox


 


 98.4 F   87   18   125/67   99 


 


 11/27/17 09:27  11/27/17 09:27  11/27/17 09:27  11/27/17 09:27  11/27/17 09:27














<Ramon Hallman - Last Filed: 11/27/17 12:46>





**Heart Score/ECG Review


  ** #1


ECG reviewed & interpreted by me at: 09:45





11/27/17 10:22


NSR 46, RSR' V2, J point elevationII, III, avF, V4-V6, ?pericarditis,  

msec





<Ramon Hallman - Last Filed: 11/27/17 12:46>





ED Treatment Course





- LABORATORY


CBC & Chemistry Diagram: 


 11/27/17 10:00





 11/27/17 10:00





- ADDITIONAL ORDERS


Additional order review: 


 Laboratory  Results











  11/27/17





  10:00


 


PT with INR  14.30 H


 


INR  1.27 H


 


PTT (Actin FS)  34.7 H








 











  11/27/17





  10:00


 


RBC  4.81


 


MCV  90.1


 


MCHC  33.8


 


RDW  13.6


 


MPV  9.7


 


Neutrophils %  75.5


 


Lymphocytes %  16.2


 


Monocytes %  7.4


 


Eosinophils %  0.6


 


Basophils %  0.3














- Medications


Given in the ED: 


ED Medications














Discontinued Medications














Generic Name Dose Route Start Last Admin





  Trade Name Pepitoq  PRN Reason Stop Dose Admin


 


Sodium Chloride  1,000 mls @ 1,000 mls/hr  11/27/17 09:31  11/27/17 10:28





  Normal Saline -  IV  11/27/17 10:30  1,000 mls/hr





  ASDIR STA   Administration


 


Morphine Sulfate  4 mg  11/27/17 09:44  11/27/17 10:26





  Morphine Injection -  IVPUSH  11/27/17 09:45  4 mg





  ONCE ONE   Administration


 


Ondansetron HCl  4 mg  11/27/17 09:31  11/27/17 10:24





  Zofran Injection  IVPB  11/27/17 09:32  4 mg





  ONCE ONE   Administration














<Maria E Hammer - Last Filed: 11/27/17 11:39>





- LABORATORY


CBC & Chemistry Diagram: 


 11/27/17 10:00





 11/27/17 10:00





<Ramon Hallman - Last Filed: 11/27/17 12:46>





Medical Decision Making





- Medical Decision Making





11/27/17 10:06





A portion of this note was documented by scribe services under my direction. I 

have reviewed the details of the note, within reason, and agree with the 

documentation with the following case summary and management plan written by 

me. 





Patient treated in the ED.





Nursing notes are reviewed and incorporated into the medical decision-making.


Vital signs reviewed.





Peripheral IV access obtained by the nurse, laboratory studies are drawn and 

sent, reviewed and interpreted by myself. 





 Vital Signs











Temp Pulse Resp BP Pulse Ox


 


 98.4 F   87   18   125/67   99 


 


 11/27/17 09:27  11/27/17 09:27  11/27/17 09:27  11/27/17 09:27  11/27/17 09:27








22-year-old male with past medical history of appendectomy presents with 

diffuse abdominal pain. The patient was here yesterday and evaluated for 

diffuse about pain with nausea vomiting and diarrhea. Had a limited 

noncontrasted CAT scan of the abdomen pelvis and an ultrasound which was 

unremarkable and the patient was sent home with by mouth Zofran. However, 

patient to have persistent vomiting and inability to tolerate by mouth and 

diffuse abdominal pain and return to the ED.





Patient stated he had similar symptoms last year where they ultimately had an 

appendectomy. However, he had repeated episodes. Denies family history of 

ulcerative colitis or Crohn's disease or other GI etiology. Never had a 

colonoscopy or endoscopy. Does not have a doctor.





The patient really prefers not to have another repeat CAT scan despite that the 

patient did not have an IV contrast study yesterday. We'll obtain blood work 

and control symptoms. If the symptoms are persistent patient is unable to 

tolerate by mouth, we'll admit the patient to hospital further evaluate patient


11/27/17 12:43





 CBC, BMP





 11/27/17 10:00 





 11/27/17 10:00 





 CMP











Sodium  139 mmol/L (136-145)   11/27/17  10:00    


 


Potassium  3.3 mmol/L (3.5-5.1)  L  11/27/17  10:00    


 


Chloride  98 mmol/L ()   11/27/17  10:00    


 


Carbon Dioxide  32 mmol/L (21-32)   11/27/17  10:00    


 


Anion Gap  9  (8-16)   11/27/17  10:00    


 


BUN  5 mg/dL (7-18)  L  11/27/17  10:00    


 


Creatinine  0.9 mg/dL (0.7-1.3)   11/27/17  10:00    


 


Creat Clearance w eGFR  > 60  (>60)   11/27/17  10:00    


 


Random Glucose  92 mg/dL ()   11/27/17  10:00    


 


Calcium  9.4 mg/dL (8.5-10.1)   11/27/17  10:00    


 


Phosphorus  2.9 mg/dL (2.5-4.9)   11/27/17  10:00    


 


Magnesium  2.1 mg/dL (1.8-2.4)   11/27/17  10:00    


 


Total Bilirubin  0.6 mg/dL (0.2-1.0)  D 11/27/17  10:00    


 


AST  10 U/L (15-37)  L  11/27/17  10:00    


 


ALT  18 U/L (12-78)   11/27/17  10:00    


 


Alkaline Phosphatase  58 U/L ()   11/27/17  10:00    


 


Total Protein  7.3 g/dl (6.4-8.2)   11/27/17  10:00    


 


Albumin  4.2 g/dl (3.4-5.0)   11/27/17  10:00    








Pt with persistent abdominal pain despite treatment.


Perhaps, we should consider IBD vs IBS?


Either way, given unable to tolerate PO 


Pt ambulatory and without rigid abdomen.


Case discussed with DR. Martínez.


Accepted for med/surg obs.


Requests Dr. Jefferson for GI consultation.





11/27/17 12:46








<Ramon Hallman - Last Filed: 11/27/17 12:46>





*DC/Admit/Observation/Transfer





- Attestations


Scribe Attestion: 





11/27/17 10:44





Documentation prepared by EN [place], acting as medical scribe 

for Ramon Hallman MD.





<Maria E Hammer - Last Filed: 11/27/17 11:39>





- Discharge Dispostion


Admit: Yes





<Ramon Hallman - Last Filed: 11/27/17 12:46>


Diagnosis at time of Disposition: 


Abdominal pain


Qualifiers:


 Abdominal location: generalized Qualified Code(s): R10.84 - Generalized 

abdominal pain








- Discharge Dispostion


Condition at time of disposition: Stable

## 2017-11-28 VITALS — DIASTOLIC BLOOD PRESSURE: 63 MMHG | HEART RATE: 58 BPM | SYSTOLIC BLOOD PRESSURE: 107 MMHG

## 2017-11-28 VITALS — TEMPERATURE: 97.2 F

## 2017-11-28 LAB
ALBUMIN SERPL-MCNC: 3.6 G/DL (ref 3.4–5)
ALP SERPL-CCNC: 50 U/L (ref 45–117)
ALT SERPL-CCNC: 15 U/L (ref 12–78)
ANION GAP SERPL CALC-SCNC: 5 MMOL/L (ref 8–16)
APPEARANCE UR: CLEAR
AST SERPL-CCNC: 7 U/L (ref 15–37)
BASOPHILS # BLD: 0.5 % (ref 0–2)
BILIRUB CONJ SERPL-MCNC: 0.2 MG/DL (ref 0–0.2)
BILIRUB SERPL-MCNC: 0.6 MG/DL (ref 0.2–1)
BILIRUB UR STRIP.AUTO-MCNC: NEGATIVE MG/DL
CALCIUM SERPL-MCNC: 8.4 MG/DL (ref 8.5–10.1)
CHOLEST SERPL-MCNC: 111 MG/DL (ref 50–200)
CO2 SERPL-SCNC: 29 MMOL/L (ref 21–32)
COLOR UR: COLORLESS
CREAT SERPL-MCNC: 0.8 MG/DL (ref 0.7–1.3)
DEPRECATED RDW RBC AUTO: 13.5 % (ref 11.9–15.9)
EOSINOPHIL # BLD: 1.3 % (ref 0–4.5)
GLUCOSE SERPL-MCNC: 80 MG/DL (ref 74–106)
KETONES UR QL STRIP: (no result)
MCH RBC QN AUTO: 30.7 PG (ref 25.7–33.7)
MCHC RBC AUTO-ENTMCNC: 34 G/DL (ref 32–35.9)
MCV RBC: 90 FL (ref 80–96)
NEUTROPHILS # BLD: 56.9 % (ref 42.8–82.8)
NITRITE UR QL STRIP: NEGATIVE
PH UR: 7 [PH] (ref 5–8)
PLATELET # BLD AUTO: 187 K/MM3 (ref 134–434)
PMV BLD: 10.3 FL (ref 7.5–11.1)
PROT SERPL-MCNC: 6.2 G/DL (ref 6.4–8.2)
PROT UR QL STRIP: NEGATIVE
PROT UR QL STRIP: NEGATIVE
RBC # UR STRIP: NEGATIVE /UL
SP GR UR: 1 (ref 1–1.03)
UROBILINOGEN UR STRIP-MCNC: NEGATIVE MG/DL (ref 0.2–1)
WBC # BLD AUTO: 8.8 K/MM3 (ref 4–10)

## 2017-11-28 PROCEDURE — 0DD98ZX EXTRACTION OF DUODENUM, VIA NATURAL OR ARTIFICIAL OPENING ENDOSCOPIC, DIAGNOSTIC: ICD-10-PCS | Performed by: INTERNAL MEDICINE

## 2017-11-28 PROCEDURE — 0DD68ZX EXTRACTION OF STOMACH, VIA NATURAL OR ARTIFICIAL OPENING ENDOSCOPIC, DIAGNOSTIC: ICD-10-PCS | Performed by: INTERNAL MEDICINE

## 2017-11-28 PROCEDURE — 0DD58ZX EXTRACTION OF ESOPHAGUS, VIA NATURAL OR ARTIFICIAL OPENING ENDOSCOPIC, DIAGNOSTIC: ICD-10-PCS | Performed by: INTERNAL MEDICINE

## 2017-11-28 RX ADMIN — SODIUM CHLORIDE SCH MLS/HR: 9 INJECTION, SOLUTION INTRAVENOUS at 04:51

## 2017-11-28 RX ADMIN — PANTOPRAZOLE SODIUM SCH MG: 40 INJECTION, POWDER, FOR SOLUTION INTRAVENOUS at 09:57

## 2017-11-28 NOTE — PN
Progress Note, Physician


History of Present Illness: 





Asymptomatic. No events overnight. MIldly elevated lipase and CBD 0.6 mm





- Current Medication List


Current Medications: 


Active Medications





Sodium Chloride (Normal Saline -)  1,000 mls @ 100 mls/hr IV ASDIR [facility]


   Last Admin: 11/28/17 04:51 Dose:  100 mls/hr


Morphine Sulfate (Morphine Sulfate)  2 mg IVPUSH Q4H PRN


   PRN Reason: PAIN


   Last Admin: 11/27/17 17:15 Dose:  2 mg


Ondansetron HCl (Zofran Odt -)  4 mg SL Q6H PRN


   PRN Reason: NAUSEA AND/OR VOMITING


Pantoprazole Sodium (Protonix Iv)  40 mg IVPUSH DAILY [facility]


   Last Admin: 11/28/17 09:57 Dose:  40 mg











- Objective


Vital Signs: 


 Vital Signs











Temperature  98.2 F   11/28/17 08:00


 


Pulse Rate  58 L  11/28/17 08:00


 


Respiratory Rate  18   11/28/17 08:00


 


Blood Pressure  102/56   11/28/17 08:00


 


O2 Sat by Pulse Oximetry (%)  98   11/27/17 20:41











Constitutional: Yes: Well Nourished, No Distress, Calm


Eyes: Yes: Conjunctiva Clear


HENT: Yes: Atraumatic


Neck: Yes: Supple


Cardiovascular: Yes: Regular Rate and Rhythm


Respiratory: Yes: Regular


Gastrointestinal: Yes: Normal Bowel Sounds, Soft


Neurological: Yes: Alert, Oriented


Labs: 


 CBC, BMP





 11/28/17 06:20 





 11/28/17 06:20 





 INR, PTT











INR  1.27  (0.82-1.09)  H  11/27/17  10:00    








 Laboratory Results - last 24 hr











  11/27/17 11/27/17 11/28/17





  06:15 22:30 06:20


 


WBC    8.8


 


RBC    4.47


 


Hgb    13.7


 


Hct    40.2


 


MCV    90.0


 


MCH    30.7


 


MCHC    34.0


 


RDW    13.5


 


Plt Count    187


 


MPV    10.3


 


Neutrophils %    56.9  D


 


Lymphocytes %    33.8  D


 


Monocytes %    7.5


 


Eosinophils %    1.3  D


 


Basophils %    0.5


 


Sodium   


 


Potassium   


 


Chloride   


 


Carbon Dioxide   


 


Anion Gap   


 


BUN   


 


Creatinine   


 


Creat Clearance w eGFR   


 


Random Glucose   


 


Calcium   


 


Total Bilirubin   


 


Direct Bilirubin   


 


AST   


 


ALT   


 


Alkaline Phosphatase   


 


Total Protein   


 


Albumin   


 


Lipase  376  


 


Urine Color   Colorless 


 


Urine Appearance   Clear 


 


Urine pH   7.0 


 


Ur Specific Gravity   1.002 


 


Urine Protein   Negative 


 


Urine Glucose (UA)   Negative 


 


Urine Ketones   Trace H 


 


Urine Blood   Negative 


 


Urine Nitrite   Negative 


 


Urine Bilirubin   Negative 


 


Urine Urobilinogen   Negative 


 


Ur Leukocyte Esterase   Negative 














  11/28/17





  06:20


 


WBC 


 


RBC 


 


Hgb 


 


Hct 


 


MCV 


 


MCH 


 


MCHC 


 


RDW 


 


Plt Count 


 


MPV 


 


Neutrophils % 


 


Lymphocytes % 


 


Monocytes % 


 


Eosinophils % 


 


Basophils % 


 


Sodium  139


 


Potassium  3.4 L


 


Chloride  105


 


Carbon Dioxide  29


 


Anion Gap  5 L


 


BUN  7  D


 


Creatinine  0.8


 


Creat Clearance w eGFR  > 60


 


Random Glucose  80


 


Calcium  8.4 L


 


Total Bilirubin  0.6


 


Direct Bilirubin  0.2


 


AST  7 L D


 


ALT  15


 


Alkaline Phosphatase  50


 


Total Protein  6.2 L


 


Albumin  3.6


 


Lipase  541 H


 


Urine Color 


 


Urine Appearance 


 


Urine pH 


 


Ur Specific Gravity 


 


Urine Protein 


 


Urine Glucose (UA) 


 


Urine Ketones 


 


Urine Blood 


 


Urine Nitrite 


 


Urine Bilirubin 


 


Urine Urobilinogen 


 


Ur Leukocyte Esterase 














- ....Imaging


Ultrasound: Report Reviewed





Problem List





- Problems


(1) Abdominal pain


Code(s): R10.9 - UNSPECIFIED ABDOMINAL PAIN   


Qualifiers: 


   Abdominal location: generalized   Qualified Code(s): R10.84 - Generalized 

abdominal pain   





(2) Nausea & vomiting


Code(s): R11.2 - NAUSEA WITH VOMITING, UNSPECIFIED   


Qualifiers: 


   Vomiting type: unspecified   Vomiting Intractability: non-intractable   

Qualified Code(s): R11.2 - Nausea with vomiting, unspecified   





(3) Elevated lipase


Code(s): R74.8 - ABNORMAL LEVELS OF OTHER SERUM ENZYMES   





(4) Dilated cbd, acquired


Code(s): K83.8 - OTHER SPECIFIED DISEASES OF BILIARY TRACT   





Assessment/Plan





EGD planned for today


Lab and US findings discussed with the patient. ?Mild biliary pancreatitis?





lipid panel


MRCP as OP if EGD negative - discussed with the patient and he agrees


Adequate hydration

## 2017-11-28 NOTE — PROC
Endoscopy Procedure


Endoscopy procedure completed. Please see scanned procedure report.





mild gastritis and duodenitis, biopsies taken

## 2017-11-28 NOTE — DS
Physical Examination


Vital Signs: 


 Vital Signs











Temperature  97.2 F L  11/28/17 14:19


 


Pulse Rate  58 L  11/28/17 14:59


 


Respiratory Rate  12   11/28/17 14:59


 


Blood Pressure  107/63   11/28/17 14:59


 


O2 Sat by Pulse Oximetry (%)  100   11/28/17 14:59











Labs: 


 CBC, BMP





 11/28/17 06:20 





 11/28/17 06:20 











Discharge Summary


Reason For Visit: ABD PAIN


Current Active Problems





Abdominal pain (Acute)


Dilated cbd, acquired (Acute)


Elevated lipase (Acute)








Condition: Stable





- Instructions


Disposition: [facility]





- Home Medications


Comprehensive Discharge Medication List: 


Ambulatory Orders





Ondansetron [Zofran Odt -] 4 mg SL TID PRN #21 od.tablet 11/26/17 


Pantoprazole Sodium [Protonix -] 40 mg PO DAILY #30 tablet.ec 11/28/17 





pt [facility]

## 2017-11-30 NOTE — PATH
Surgical Pathology Report



Patient Name:  MATHEW CHRISTENSEN

Accession #:  [id number]

[facility]. Rec. #:  [id number]                                                        

   /Age/Gender:  1995 (Age: 22) / M

Account:  [id number]                                                          

             Location: [address] [place] [address]/[facility]

Taken:  2017

Received:  2017

Reported:  2017

Physicians:  Ramo Jefferson M.D.

  



Specimen(s) Received

A: BX DUODENUM 2ND PORTION 

B: BX OF FUNDUS 

C: BX ANTRUM BODY 

D: BX ESOPHAGUS 





Clinical History

Preoperative diagnosis: Abdominal pain

Postoperative diagnosis: Duodenitis, gastritis







Final Diagnosis

A. DUODENUM, SECOND PORTION, BIOPSY:

DUODENAL MUCOSA WITH NO PATHOLOGIC CHANGES.

NO HISTOLOGIC EVIDENCE OF GLUTEN SENSITIVE ENTEROPATHY (CELIAC SPRUE)

IDENTIFIED. 



B. STOMACH, FUNDUS, BIOPSY:

GASTRIC FUNDIC MUCOSA AND EXTRAVASATION OF RED BLOOD CELLS WITHIN LAMINA

PROPRIA.

NO INFLAMMATION IDENTIFIED.

IMMUNOSTAIN FOR H. PYLORI IS NEGATIVE.  



C. STOMACH, ANTRUM AND BODY, BIOPSY:

GASTRIC ANTRAL AND FUNDIC MUCOSA WITH NO PATHOLOGIC CHANGES. 

IMMUNOSTAIN FOR H. PYLORI IS NEGATIVE.  



D. ESOPHAGUS, BIOPSY:

SQUAMOUS EPITHELIUM WITH NO PATHOLOGIC CHANGES.

NO INTESTINAL METAPLASIA IDENTIFIED (NO HANSON'S IDENTIFIED).

NO EOSINOPHILIC ESOPHAGITIS IDENTIFIED.







***Electronically Signed***

Ismael Carlson M.D.





Gross Description

A.  Received in formalin, labeled "biopsy duodenum second portion" are 2 tan,

irregular portions of soft tissue averaging 0.3 cm. in greatest dimension. The

specimens are submitted in toto in one cassette.



B.  Received in formalin, labeled "biopsy fundus" are 2 tan, irregular portions

of soft tissue measuring 0.2 and 0.3 cm. in greatest dimension. The specimens

are submitted in toto in one cassette.



C.  Received in formalin, labeled "biopsy antrum/body" are 2 tan, irregular

portions of soft tissue measuring 0.4 and 0.7 cm. in greatest dimension. The

specimens are submitted in toto in one cassette.



D.  Received in formalin, labeled "biopsy esophagus" is a tan, irregular portion

of soft tissue measuring 0.3 cm. in greatest dimension. The specimen is

submitted in toto in one cassette.

## 2017-12-04 NOTE — EKG
Test Reason : 

Blood Pressure : ***/*** mmHG

Vent. Rate : 046 BPM     Atrial Rate : 046 BPM

   P-R Int : 142 ms          QRS Dur : 096 ms

    QT Int : 438 ms       P-R-T Axes : 063 083 076 degrees

   QTc Int : 383 ms

 

SINUS BRADYCARDIA

ST ELEVATION, CONSIDER EARLY REPOLARIZATION, PERICARDITIS, OR INJURY

INCOMPLETE RIGHT BUNDLE BRANCH BLOCK

NONSPECIFIC ST ABNORMALITY

ABNORMAL ECG

WHEN COMPARED WITH ECG OF 26-NOV-2017 16:10,

NO SIGNIFICANT CHANGE WAS FOUND

Confirmed by CHACORTA ARCE MD ([id number]) on 12/4/2017 1:32:04 PM

 

Referred By:             Confirmed By:CHACORTA ARCE MD

## 2018-04-08 ENCOUNTER — HOSPITAL ENCOUNTER (EMERGENCY)
Dept: HOSPITAL 74 - JER | Age: 23
LOS: 1 days | Discharge: HOME | End: 2018-04-09
Payer: COMMERCIAL

## 2018-04-08 VITALS — BODY MASS INDEX: 16.7 KG/M2

## 2018-04-08 DIAGNOSIS — Z87.19: ICD-10-CM

## 2018-04-08 DIAGNOSIS — G43.A0: Primary | ICD-10-CM

## 2018-04-08 PROCEDURE — 3E033GC INTRODUCTION OF OTHER THERAPEUTIC SUBSTANCE INTO PERIPHERAL VEIN, PERCUTANEOUS APPROACH: ICD-10-PCS | Performed by: EMERGENCY MEDICINE

## 2018-04-09 ENCOUNTER — HOSPITAL ENCOUNTER (EMERGENCY)
Dept: HOSPITAL 74 - JER | Age: 23
Discharge: HOME | End: 2018-04-09
Payer: COMMERCIAL

## 2018-04-09 VITALS — SYSTOLIC BLOOD PRESSURE: 116 MMHG | HEART RATE: 61 BPM | DIASTOLIC BLOOD PRESSURE: 71 MMHG | TEMPERATURE: 97.6 F

## 2018-04-09 VITALS — TEMPERATURE: 98.4 F

## 2018-04-09 VITALS — SYSTOLIC BLOOD PRESSURE: 127 MMHG | DIASTOLIC BLOOD PRESSURE: 81 MMHG | HEART RATE: 84 BPM

## 2018-04-09 VITALS — BODY MASS INDEX: 17.9 KG/M2

## 2018-04-09 DIAGNOSIS — Z98.890: ICD-10-CM

## 2018-04-09 DIAGNOSIS — R19.8: Primary | ICD-10-CM

## 2018-04-09 DIAGNOSIS — R11.2: ICD-10-CM

## 2018-04-09 DIAGNOSIS — Z87.19: ICD-10-CM

## 2018-04-09 LAB
ALBUMIN SERPL-MCNC: 4.5 G/DL (ref 3.4–5)
ALBUMIN SERPL-MCNC: 4.6 G/DL (ref 3.4–5)
ALP SERPL-CCNC: 75 U/L (ref 45–117)
ALP SERPL-CCNC: 76 U/L (ref 45–117)
ALT SERPL-CCNC: 27 U/L (ref 12–78)
ALT SERPL-CCNC: 27 U/L (ref 12–78)
AMPHET UR-MCNC: NEGATIVE NG/ML
ANION GAP SERPL CALC-SCNC: 11 MMOL/L (ref 8–16)
ANION GAP SERPL CALC-SCNC: 11 MMOL/L (ref 8–16)
AST SERPL-CCNC: 24 U/L (ref 15–37)
AST SERPL-CCNC: 27 U/L (ref 15–37)
BARBITURATES UR-MCNC: NEGATIVE NG/ML
BASOPHILS # BLD: 0.3 % (ref 0–2)
BASOPHILS # BLD: 1.3 % (ref 0–2)
BENZODIAZ UR SCN-MCNC: NEGATIVE NG/ML
BILIRUB SERPL-MCNC: 0.5 MG/DL (ref 0.2–1)
BILIRUB SERPL-MCNC: 0.5 MG/DL (ref 0.2–1)
BUN SERPL-MCNC: 7 MG/DL (ref 7–18)
BUN SERPL-MCNC: 9 MG/DL (ref 7–18)
CALCIUM SERPL-MCNC: 9.7 MG/DL (ref 8.5–10.1)
CALCIUM SERPL-MCNC: 9.8 MG/DL (ref 8.5–10.1)
CHLORIDE SERPL-SCNC: 100 MMOL/L (ref 98–107)
CHLORIDE SERPL-SCNC: 102 MMOL/L (ref 98–107)
CO2 SERPL-SCNC: 27 MMOL/L (ref 21–32)
CO2 SERPL-SCNC: 27 MMOL/L (ref 21–32)
COCAINE UR-MCNC: NEGATIVE NG/ML
CREAT SERPL-MCNC: 0.9 MG/DL (ref 0.7–1.3)
CREAT SERPL-MCNC: 0.9 MG/DL (ref 0.7–1.3)
DEPRECATED RDW RBC AUTO: 13.5 % (ref 11.9–15.9)
DEPRECATED RDW RBC AUTO: 13.6 % (ref 11.9–15.9)
EOSINOPHIL # BLD: 0 % (ref 0–4.5)
EOSINOPHIL # BLD: 0.5 % (ref 0–4.5)
GLUCOSE SERPL-MCNC: 100 MG/DL (ref 74–106)
GLUCOSE SERPL-MCNC: 119 MG/DL (ref 74–106)
HCT VFR BLD CALC: 42.5 % (ref 35.4–49)
HCT VFR BLD CALC: 42.8 % (ref 35.4–49)
HGB BLD-MCNC: 14.3 GM/DL (ref 11.7–16.9)
HGB BLD-MCNC: 14.4 GM/DL (ref 11.7–16.9)
LIPASE SERPL-CCNC: 297 U/L (ref 73–393)
LYMPHOCYTES # BLD: 6.4 % (ref 8–40)
LYMPHOCYTES # BLD: 8 % (ref 8–40)
MCH RBC QN AUTO: 29.9 PG (ref 25.7–33.7)
MCH RBC QN AUTO: 30.5 PG (ref 25.7–33.7)
MCHC RBC AUTO-ENTMCNC: 33.3 G/DL (ref 32–35.9)
MCHC RBC AUTO-ENTMCNC: 33.8 G/DL (ref 32–35.9)
MCV RBC: 89.9 FL (ref 80–96)
MCV RBC: 90.1 FL (ref 80–96)
METHADONE UR-MCNC: NEGATIVE NG/ML
MONOCYTES # BLD AUTO: 4.3 % (ref 3.8–10.2)
MONOCYTES # BLD AUTO: 4.7 % (ref 3.8–10.2)
NEUTROPHILS # BLD: 86.9 % (ref 42.8–82.8)
NEUTROPHILS # BLD: 87.6 % (ref 42.8–82.8)
OPIATES UR QL SCN: NEGATIVE NG/ML
PCP UR QL SCN: NEGATIVE NG/ML
PLATELET # BLD AUTO: 258 K/MM3 (ref 134–434)
PLATELET # BLD AUTO: 262 K/MM3 (ref 134–434)
PMV BLD: 10 FL (ref 7.5–11.1)
PMV BLD: 9.9 FL (ref 7.5–11.1)
POTASSIUM SERPLBLD-SCNC: 4 MMOL/L (ref 3.5–5.1)
POTASSIUM SERPLBLD-SCNC: 4.3 MMOL/L (ref 3.5–5.1)
PROT SERPL-MCNC: 8.3 G/DL (ref 6.4–8.2)
PROT SERPL-MCNC: 8.3 G/DL (ref 6.4–8.2)
RBC # BLD AUTO: 4.72 M/MM3 (ref 4–5.6)
RBC # BLD AUTO: 4.76 M/MM3 (ref 4–5.6)
SODIUM SERPL-SCNC: 138 MMOL/L (ref 136–145)
SODIUM SERPL-SCNC: 140 MMOL/L (ref 136–145)
WBC # BLD AUTO: 14.4 K/MM3 (ref 4–10)
WBC # BLD AUTO: 15.9 K/MM3 (ref 4–10)

## 2018-04-09 PROCEDURE — 3E033NZ INTRODUCTION OF ANALGESICS, HYPNOTICS, SEDATIVES INTO PERIPHERAL VEIN, PERCUTANEOUS APPROACH: ICD-10-PCS | Performed by: EMERGENCY MEDICINE

## 2018-04-09 PROCEDURE — 3E0337Z INTRODUCTION OF ELECTROLYTIC AND WATER BALANCE SUBSTANCE INTO PERIPHERAL VEIN, PERCUTANEOUS APPROACH: ICD-10-PCS | Performed by: EMERGENCY MEDICINE

## 2018-04-09 PROCEDURE — 3E033GC INTRODUCTION OF OTHER THERAPEUTIC SUBSTANCE INTO PERIPHERAL VEIN, PERCUTANEOUS APPROACH: ICD-10-PCS | Performed by: EMERGENCY MEDICINE

## 2018-04-09 NOTE — PDOC
History of Present Illness





- General


Chief Complaint: Nausea/Vomiting


Stated Complaint: VOMITING,STOMACH PAIN


Time Seen by Provider: 04/08/18 23:21





- History of Present Illness


Initial Comments: 





04/09/18 03:18


CHIEF COMPLAINT: vomiting





HISTORY OF PRESENT ILLNESS: 24 yo M presents to ED with persistent vomiting 

since this morning.  Patient has had multiple visits to this ER with similar 

symptoms and had EGD with biopsy in 11/2017 with diagnosis of duodenitis and 

gastritis. Patient reports that he does smoke marijuana and last use was one 

week ago.  Patient states he has been dry heaving all day. 





No recent travel or sick contacts. 





PAST MEDICAL HISTORY: Denies past medical history





FAMILY HISTORY: Denies





SOCIAL HISTORY: Denies tobacco, alcohol, illicit drug use. 





SURGICAL HISTORY: Denies





ALLERGIES: No known drug allergies





REVIEW OF SYSTEMS


General/Constitutional: Denies fever or chills. Denies weakness, weight change.





HEENT: Denies change in vision. Denies ear pain or discharge. Denies sore 

throat.





Cardiovascular: Denies chest pain or shortness of breath.





Respiratory: Denies cough, wheezing, or hemoptysis.





Gastrointestinal: Denies nausea, vomiting, diarrhea or constipation. Denies 

rectal bleeding.





Genitourinary: Denies dysuria, frequency, or change in urination.





Musculoskeletal: Denies joint or muscle swelling or pain. Denies neck or back 

pain.





Skin and breasts: Denies rash or easy bruising.





Neurologic: Denies headache, vertigo, loss of consciousness, or loss of 

sensation.





PHYSICAL EXAM


General Appearance: Well-appearing, appropriately dressed.  No apparent distress

, no intoxication.





HEENT: EOMI, PERRLA, normal ENT inspection, normal voice, TMs normal, pharynx 

normal.  No conjunctival pallor.  No photophobia, scleral icterus.





Neck: Supple.  Trachea midline. No tenderness, rigidity, carotid bruit, stridor

, lymphadenopathy, or thyromegaly. 





Respiratory/Chest: Lungs CTAB.  No shortness of breath, chest tenderness, 

respiratory distress, accessory muscle use. No crackles, rales, rhonchi, stridor

, wheezing, dullness





Cardiovascular: RRR. S1, S2.  No JVD, murmur, bradycardia, tachycardia.





Vascular Pulses: Dorsalis-Pedis (R): 2+, Dorsalis-Pedis (L): 2+





Gastrointestinal/Abdominal: Normal bowel sounds.  Abdomen soft, non-distended.  

No tenderness or rebound tenderness. No  organomegaly, pulsatile mass, guarding

, hernia, hepatomegaly, splenomegaly.





Lymphatic: No adenopathy, tenderness.





Musculoskeletal/Extremities:  Normal inspection. FROM of all extremities, 

normal capillary refill.  Pelvis Stable.  No CVA tenderness. No tenderness to 

extremities, pedal edema, swelling, erythema or deformity.





Integumentary: Appropriate color, dry, warm.  No cyanosis, erythema, jaundice 

or rash





Neurologic: CNs II-XII intact. Fully oriented, alert.  Appropriate mood/affect. 

Motor strength 5/5.  No appreciable EOM palsy, facial droop or sensory deficit.





Past History





- Past Medical History


Allergies/Adverse Reactions: 


 Allergies











Allergy/AdvReac Type Severity Reaction Status Date / Time


 


cefaclor [From [facility]] Allergy Severe "throat Verified 04/08/18 23:16





   swelling"  











Home Medications: 


Ambulatory Orders





Ondansetron [Zofran Odt -] 4 mg SL TID PRN #21 od.tablet 11/26/17 


Pantoprazole Sodium [Protonix -] 40 mg PO DAILY #30 tablet.ec 11/28/17 


Ondansetron [Zofran Odt -] 4 mg SL TID #21 od.tablet 04/09/18 








Asthma: Yes


COPD: No


Thyroid Disease: No





- Surgical History


Abdominal Surgery: Yes


Appendectomy: Yes


Neurologic Surgery: Yes (Tumor Removal 2008)





- Suicide/Smoking/Psychosocial Hx


Smoking History: Never smoked


Have you smoked in the past 12 months: No


Hx Alcohol Use: No


Drug/Substance Use Hx: No


Substance Use Type: None





*Physical Exam





- Vital Signs


 Last Vital Signs











Temp Pulse Resp BP Pulse Ox


 


 97.8 F   59 L  18   116/74   100 


 


 04/08/18 23:17  04/08/18 23:17  04/08/18 23:17  04/08/18 23:17  04/08/18 23:17














ED Treatment Course





- LABORATORY


CBC & Chemistry Diagram: 


 04/09/18 00:30





 04/09/18 00:30





- ADDITIONAL ORDERS


Additional order review: 


 Laboratory  Results











  04/09/18 04/09/18





  00:30 00:30


 


Sodium   140


 


Potassium   4.0


 


Chloride   102


 


Carbon Dioxide   27


 


Anion Gap   11


 


BUN   9  D


 


Creatinine   0.9


 


Creat Clearance w eGFR   > 60


 


Random Glucose   119 H D


 


Calcium   9.7


 


Total Bilirubin   0.5


 


AST   27  D


 


ALT   27  D


 


Alkaline Phosphatase   76  D


 


Total Protein   8.3 H D


 


Albumin   4.6  D


 


Lipase  127 








 











  04/09/18





  00:30


 


RBC  4.72


 


MCV  90.1


 


MCHC  33.8


 


RDW  13.6


 


MPV  10.0


 


Neutrophils %  86.9 H D


 


Lymphocytes %  8.0  D


 


Monocytes %  4.3


 


Eosinophils %  0.5


 


Basophils %  0.3














- Medications


Given in the ED: 


ED Medications














Discontinued Medications














Generic Name Dose Route Start Last Admin





  Trade Name Pricila  PRN Reason Stop Dose Admin


 


Metoclopramide HCl  10 mg  04/08/18 23:24  04/09/18 00:37





  Reglan Injection -  IVPUSH  04/08/18 23:25  10 mg





  ONCE ONE   Administration


 


Ondansetron HCl  8 mg  04/09/18 02:06  04/09/18 02:47





  Zofran Injection  IVPUSH  04/09/18 02:07  8 mg





  ONCE ONE   Administration


 


Sodium Chloride  1,000 ml  04/08/18 23:24  04/09/18 00:37





  Normal Saline -  IV  04/08/18 23:25  1,000 ml





  ONCE ONE   Administration














Medical Decision Making





- Medical Decision Making





04/09/18 03:23


24 yo M presents to ED with persistent vomiting since this morning. 











*DC/Admit/Observation/Transfer


Diagnosis at time of Disposition: 


 Cyclical vomiting syndrome








- Discharge Dispostion


Disposition: HOME


Condition at time of disposition: Stable


Admit: No





- Prescriptions


Prescriptions: 


Ondansetron [Zofran Odt -] 4 mg SL TID #21 od.tablet





- Referrals


Referrals: 


Ramo Jefferson MD [Staff Physician] - 





- Patient Instructions


Printed Discharge Instructions:  DI for Vomiting -- Adult


Additional Instructions: 


Take medications as directed. As discussed, you must follow up with Dr. Jefferson 

THIS WEEK for further evaluation and monitoring.  If you develop any fever, 

chills, persistent vomiting or diarrhea, or any new or worsening symptoms, 

return to the ER immediately. 





- Post Discharge Activity

## 2018-04-10 ENCOUNTER — HOSPITAL ENCOUNTER (EMERGENCY)
Dept: HOSPITAL 74 - JER | Age: 23
Discharge: HOME | End: 2018-04-10
Payer: COMMERCIAL

## 2018-04-10 VITALS — TEMPERATURE: 100 F | HEART RATE: 58 BPM | SYSTOLIC BLOOD PRESSURE: 112 MMHG | DIASTOLIC BLOOD PRESSURE: 84 MMHG

## 2018-04-10 VITALS — BODY MASS INDEX: 17.9 KG/M2

## 2018-04-10 DIAGNOSIS — R11.2: Primary | ICD-10-CM

## 2018-04-10 LAB
ALBUMIN SERPL-MCNC: 4.1 G/DL (ref 3.4–5)
ALP SERPL-CCNC: 67 U/L (ref 45–117)
ALT SERPL-CCNC: 24 U/L (ref 12–78)
ANION GAP SERPL CALC-SCNC: 8 MMOL/L (ref 8–16)
AST SERPL-CCNC: 21 U/L (ref 15–37)
BASOPHILS # BLD: 0.3 % (ref 0–2)
BILIRUB SERPL-MCNC: 0.5 MG/DL (ref 0.2–1)
BUN SERPL-MCNC: 7 MG/DL (ref 7–18)
CALCIUM SERPL-MCNC: 9.3 MG/DL (ref 8.5–10.1)
CHLORIDE SERPL-SCNC: 103 MMOL/L (ref 98–107)
CO2 SERPL-SCNC: 28 MMOL/L (ref 21–32)
CREAT SERPL-MCNC: 0.9 MG/DL (ref 0.7–1.3)
DEPRECATED RDW RBC AUTO: 13.4 % (ref 11.9–15.9)
EOSINOPHIL # BLD: 0.2 % (ref 0–4.5)
GLUCOSE SERPL-MCNC: 108 MG/DL (ref 74–106)
HCT VFR BLD CALC: 40.5 % (ref 35.4–49)
HGB BLD-MCNC: 13.8 GM/DL (ref 11.7–16.9)
LIPASE SERPL-CCNC: 117 U/L (ref 73–393)
LYMPHOCYTES # BLD: 12.9 % (ref 8–40)
MCH RBC QN AUTO: 30.8 PG (ref 25.7–33.7)
MCHC RBC AUTO-ENTMCNC: 34.2 G/DL (ref 32–35.9)
MCV RBC: 90.1 FL (ref 80–96)
MONOCYTES # BLD AUTO: 10.5 % (ref 3.8–10.2)
NEUTROPHILS # BLD: 76.1 % (ref 42.8–82.8)
PLATELET # BLD AUTO: 232 K/MM3 (ref 134–434)
PMV BLD: 9.8 FL (ref 7.5–11.1)
POTASSIUM SERPLBLD-SCNC: 4 MMOL/L (ref 3.5–5.1)
PROT SERPL-MCNC: 7.3 G/DL (ref 6.4–8.2)
RBC # BLD AUTO: 4.5 M/MM3 (ref 4–5.6)
SODIUM SERPL-SCNC: 139 MMOL/L (ref 136–145)
WBC # BLD AUTO: 11.9 K/MM3 (ref 4–10)

## 2018-04-10 NOTE — PDOC
History of Present Illness





- General


Stated Complaint: ABD PAIN


Time Seen by Provider: 04/10/18 03:39


History Source: Patient


Exam Limitations: No Limitations





- History of Present Illness


Travel History: No


Initial Comments: 





04/10/18 05:39


23-year-old male presents to the emergency department complaining of nausea/

vomiting 6 hours after being discharged from the emergency department for 

similar symptoms. Patient states he hasn't really eaten or drank as much fluids 

since being discharged earlier yesterday. Patient denies headache, dizziness, 

lightheadedness, neck pain, back pains, chest pain, shortness of breath, 

abdominal pains, flank pains, urinary symptoms. Patient states once he gets 

Zofran, he will feel fine.





Past History





- Past Medical History


Allergies/Adverse Reactions: 


 Allergies











Allergy/AdvReac Type Severity Reaction Status Date / Time


 


cefaclor [From Ceclor] Allergy Severe "throat Verified 04/10/18 03:39





   swelling"  











Home Medications: 


Ambulatory Orders





Ondansetron [Zofran Odt -] 4 mg SL BID PRN 04/10/18 








Asthma: Yes


COPD: No


Thyroid Disease: No





- Surgical History


Abdominal Surgery: Yes


Appendectomy: Yes


Neurologic Surgery: Yes (Tumor Removal 2008)





- Suicide/Smoking/Psychosocial Hx


Smoking History: Never smoked


Have you smoked in the past 12 months: No


Information on smoking cessation initiated: No


Hx Alcohol Use: No


Drug/Substance Use Hx: No


Substance Use Type: None





**Review of Systems





- Review of Systems


Able to Perform ROS?: Yes


Comments:: 





04/10/18 05:38


CONSTITUTIONAL: 


Absent: fever, chills, diaphoresis, generalized weakness, malaise, loss of 

appetite


HEENT: 


Absent: rhinorrhea, nasal congestion, throat pain, throat swelling, difficulty 

swallowing, mouth swelling, ear pain, eye pain, visual Changes


CARDIOVASCULAR: 


Absent: chest pain, loss of consciousness, palpitations, irregular heart rate, 

peripheral edema


RESPIRATORY: 


Absent: cough, shortness of breath, dyspnea with exertion, orthopnea, wheezing, 

stridor, hemoptysis


GASTROINTESTINAL:


+n/v


Absent: abdominal pain, abdominal distension, diarrhea, constipation, melena, 

hematochezia


GENITOURINARY: 


Absent: dysuria, frequency, urgency, hesitancy, hematuria, flank pain, genital 

pain


MUSCULOSKELETAL: 


Absent: myalgia, arthralgia, joint swelling


SKIN: 


Absent: rash, itching, pallor





Is the patient limited English proficient: No





*Physical Exam





- Vital Signs


 Last Vital Signs











Temp Pulse Resp BP Pulse Ox


 


 100.0 F H  58 L  18   112/84   99 


 


 04/10/18 03:38  04/10/18 03:38  04/10/18 03:38  04/10/18 03:38  04/10/18 03:38














- Physical Exam


Comments: 





04/10/18 05:38


GENERAL:


Well developed, well nourished. Awake and alert. No acute distress.


HEENT:


Normocephalic, atraumatic. PERRLA, EOMI. No conjunctival pallor. Sclera are non-

icteric. Moist mucous membranes. Oropharynx is clear.


NECK: 


Supple. Full ROM. No JVD. Carotid pulses 2+ and symmetric, without bruits. No 

thyromegaly. No lymphadenopathy.


CARDIOVASCULAR:


Regular rate and rhythm. No murmurs, rubs, or gallops. Distal pulses are 2+ and 

symmetric. 


PULMONARY: 


No evidence of respiratory distress. Lungs clear to auscultation bilaterally. 

No wheezing, rales or rhonchi.


ABDOMINAL:


Soft. Non-tender. Non-distended. No rebound or guarding. No organomegaly. 

Normoactive bowel sounds. 


MUSCULOSKELETAL 


Normal range of motion at all joints. No bony deformities or tenderness. No CVA 

tenderness.


EXTREMITIES: 


No cyanosis. No clubbing. No edema. No calf tenderness.


SKIN: 


Warm and dry. Normal capillary refill. No rashes. No jaundice. 


NEUROLOGICAL: 


Alert, awake, appropriate. Cranial nerves 2-12 intact. No deficits to light 

touch and temperature in face, upper extremities and lower extremities. No 

motor deficits in the in face, upper extremities and lower extremities. 

Normoreflexic in the upper and lower extremities. Normal speech. Toes are down-

going bilaterally. Gait is normal without ataxia.


PSYCHIATRIC: 


Cooperative. Good eye contact. Appropriate mood and affect.





ED Treatment Course





- LABORATORY


CBC & Chemistry Diagram: 


 04/10/18 03:41





 04/10/18 03:41





Progress Note





- Progress Note


Progress Note: 





0446hrss: PO challenge. Pt felt "fine


0538hrs: PO challenge. Pt says hes ready to be d/c to go home to sleep





*DC/Admit/Observation/Transfer


Diagnosis at time of Disposition: 


Nausea & vomiting


Qualifiers:


 Vomiting type: unspecified Vomiting Intractability: non-intractable Qualified 

Code(s): R11.2 - Nausea with vomiting, unspecified








- Discharge Dispostion


Disposition: HOME


Condition at time of disposition: Stable


Admit: No





- Referrals


Referrals: 


Lucas Griffith MD [Staff Physician] - 





- Patient Instructions


Printed Discharge Instructions:  Nausea and Vomiting-Adult


Additional Instructions: 


Follow-up with the gastroenterologist within 48 hours


Return back to the emergency department for severe/persistent or worsening 

symptoms





- Post Discharge Activity

## 2018-05-10 ENCOUNTER — HOSPITAL ENCOUNTER (EMERGENCY)
Dept: HOSPITAL 74 - JER | Age: 23
Discharge: LEFT BEFORE DISCHARGE | End: 2018-05-10
Payer: COMMERCIAL

## 2018-05-10 VITALS — DIASTOLIC BLOOD PRESSURE: 98 MMHG | SYSTOLIC BLOOD PRESSURE: 140 MMHG | HEART RATE: 67 BPM | TEMPERATURE: 98.1 F

## 2018-05-10 VITALS — BODY MASS INDEX: 18.4 KG/M2

## 2018-05-10 DIAGNOSIS — R11.2: Primary | ICD-10-CM

## 2018-05-10 LAB
ALBUMIN SERPL-MCNC: 4.4 G/DL (ref 3.4–5)
ALP SERPL-CCNC: 53 U/L (ref 45–117)
ALT SERPL-CCNC: 18 U/L (ref 12–78)
ANION GAP SERPL CALC-SCNC: 7 MMOL/L (ref 8–16)
AST SERPL-CCNC: 22 U/L (ref 15–37)
BASOPHILS # BLD: 0.1 % (ref 0–2)
BILIRUB SERPL-MCNC: 0.5 MG/DL (ref 0.2–1)
BUN SERPL-MCNC: 8 MG/DL (ref 7–18)
CALCIUM SERPL-MCNC: 9.2 MG/DL (ref 8.5–10.1)
CHLORIDE SERPL-SCNC: 103 MMOL/L (ref 98–107)
CO2 SERPL-SCNC: 29 MMOL/L (ref 21–32)
CREAT SERPL-MCNC: 0.9 MG/DL (ref 0.7–1.3)
DEPRECATED RDW RBC AUTO: 13.7 % (ref 11.9–15.9)
EOSINOPHIL # BLD: 1.1 % (ref 0–4.5)
GLUCOSE SERPL-MCNC: 92 MG/DL (ref 74–106)
HCT VFR BLD CALC: 43.7 % (ref 35.4–49)
HGB BLD-MCNC: 15.2 GM/DL (ref 11.7–16.9)
LIPASE SERPL-CCNC: 120 U/L (ref 73–393)
LYMPHOCYTES # BLD: 9.3 % (ref 8–40)
MAGNESIUM SERPL-MCNC: 2.1 MG/DL (ref 1.8–2.4)
MCH RBC QN AUTO: 31.3 PG (ref 25.7–33.7)
MCHC RBC AUTO-ENTMCNC: 34.7 G/DL (ref 32–35.9)
MCV RBC: 90.2 FL (ref 80–96)
MONOCYTES # BLD AUTO: 4.6 % (ref 3.8–10.2)
NEUTROPHILS # BLD: 84.9 % (ref 42.8–82.8)
PLATELET # BLD AUTO: 199 K/MM3 (ref 134–434)
PMV BLD: 10.8 FL (ref 7.5–11.1)
POTASSIUM SERPLBLD-SCNC: 4.1 MMOL/L (ref 3.5–5.1)
PROT SERPL-MCNC: 7.6 G/DL (ref 6.4–8.2)
RBC # BLD AUTO: 4.84 M/MM3 (ref 4–5.6)
SODIUM SERPL-SCNC: 139 MMOL/L (ref 136–145)
WBC # BLD AUTO: 11.1 K/MM3 (ref 4–10)

## 2018-05-10 PROCEDURE — 3E0337Z INTRODUCTION OF ELECTROLYTIC AND WATER BALANCE SUBSTANCE INTO PERIPHERAL VEIN, PERCUTANEOUS APPROACH: ICD-10-PCS

## 2018-05-10 NOTE — PDOC
*Physical Exam





- Vital Signs


 Last Vital Signs











Temp Pulse Resp BP Pulse Ox


 


 98.1 F   67   20   140/98   100 


 


 05/10/18 12:09  05/10/18 12:09  05/10/18 12:09  05/10/18 12:09  05/10/18 12:09














ED Treatment Course





- LABORATORY


CBC & Chemistry Diagram: 


 05/10/18 12:55





 05/10/18 12:55





- ADDITIONAL ORDERS


Additional order review: 


 Laboratory  Results











  05/10/18





  12:55


 


Sodium  139


 


Potassium  4.1


 


Chloride  103


 


Carbon Dioxide  29


 


Anion Gap  7 L


 


BUN  8


 


Creatinine  0.9


 


Creat Clearance w eGFR  > 60


 


Random Glucose  92


 


Calcium  9.2


 


Magnesium  2.1


 


Total Bilirubin  0.5


 


AST  22


 


ALT  18  D


 


Alkaline Phosphatase  53  D


 


Total Protein  7.6


 


Albumin  4.4


 


Lipase  120








 











  05/10/18





  12:55


 


RBC  4.84


 


MCV  90.2


 


MCHC  34.7


 


RDW  13.7


 


MPV  10.8  D


 


Neutrophils %  84.9 H


 


Lymphocytes %  9.3  D


 


Monocytes %  4.6


 


Eosinophils %  1.1  D


 


Basophils %  0.1














- Medications


Given in the ED: 


ED Medications














Discontinued Medications














Generic Name Dose Route Start Last Admin





  Trade Name Freq  PRN Reason Stop Dose Admin


 


Sodium Chloride  1,000 mls @ 1,000 mls/hr  05/10/18 12:38  05/10/18 13:00





  Normal Saline -  IV  05/10/18 13:37  1,000 mls/hr





  ASDIR STA   Administration


 


Metoclopramide HCl  10 mg  05/10/18 12:38  05/10/18 13:00





  Reglan Injection -  IVPB  05/10/18 12:39  10 mg





  ONCE ONE   Administration














Medical Decision Making





- Medical Decision Making





05/10/18 14:47





Pt seen by the Advanced Practice Provider under my direct supervision


Ancillary studies reviewed





I agree with plan as outlined by the Advanced Practice Provider MAGUI Meza





*DC/Admit/Observation/Transfer


Diagnosis at time of Disposition: 


 Nausea & vomiting








- Discharge Dispostion


Disposition: ELP


Condition at time of disposition: Unchanged/Unknown





- Referrals





- Patient Instructions





- Post Discharge Activity

## 2018-05-18 NOTE — EKG
Test Reason : 

Blood Pressure : ***/*** mmHG

Vent. Rate : 055 BPM     Atrial Rate : 055 BPM

   P-R Int : 150 ms          QRS Dur : 098 ms

    QT Int : 442 ms       P-R-T Axes : 078 078 072 degrees

   QTc Int : 422 ms

 

SINUS BRADYCARDIA

BIATRIAL ENLARGEMENT

ST ELEVATION, CONSIDER EARLY REPOLARIZATION, PERICARDITIS, OR INJURY

ABNORMAL ECG

WHEN COMPARED WITH ECG OF 27-NOV-2017 09:44,

NO SIGNIFICANT CHANGE WAS FOUND

Confirmed by TATO GOLDMAN MD ([id number]) on 5/18/2018 11:19:51 AM

 

Referred By:             Confirmed By:TATO GOLDMAN MD

## 2018-10-07 ENCOUNTER — HOSPITAL ENCOUNTER (EMERGENCY)
Dept: HOSPITAL 74 - JER | Age: 23
Discharge: LEFT BEFORE DISCHARGE | End: 2018-10-07
Payer: COMMERCIAL

## 2018-10-07 VITALS — DIASTOLIC BLOOD PRESSURE: 62 MMHG | HEART RATE: 56 BPM | SYSTOLIC BLOOD PRESSURE: 109 MMHG

## 2018-10-07 VITALS — BODY MASS INDEX: 18.4 KG/M2

## 2018-10-07 VITALS — TEMPERATURE: 98.2 F

## 2018-10-07 DIAGNOSIS — F10.10: ICD-10-CM

## 2018-10-07 DIAGNOSIS — R11.2: Primary | ICD-10-CM

## 2018-10-07 LAB
ALBUMIN SERPL-MCNC: 4.6 G/DL (ref 3.4–5)
ALP SERPL-CCNC: 63 U/L (ref 45–117)
ALT SERPL-CCNC: 31 U/L (ref 13–61)
ANION GAP SERPL CALC-SCNC: 8 MMOL/L (ref 8–16)
AST SERPL-CCNC: 30 U/L (ref 15–37)
BASOPHILS # BLD: 0.7 % (ref 0–2)
BILIRUB SERPL-MCNC: 0.4 MG/DL (ref 0.2–1)
BUN SERPL-MCNC: 7 MG/DL (ref 7–18)
CALCIUM SERPL-MCNC: 9.3 MG/DL (ref 8.5–10.1)
CHLORIDE SERPL-SCNC: 105 MMOL/L (ref 98–107)
CO2 SERPL-SCNC: 26 MMOL/L (ref 21–32)
CREAT SERPL-MCNC: 1 MG/DL (ref 0.55–1.3)
DEPRECATED RDW RBC AUTO: 14.2 % (ref 11.9–15.9)
EOSINOPHIL # BLD: 0.6 % (ref 0–4.5)
GLUCOSE SERPL-MCNC: 98 MG/DL (ref 74–106)
HCT VFR BLD CALC: 44.4 % (ref 35.4–49)
HGB BLD-MCNC: 14.7 GM/DL (ref 11.7–16.9)
LIPASE SERPL-CCNC: 75 U/L (ref 73–393)
LYMPHOCYTES # BLD: 11.7 % (ref 8–40)
MCH RBC QN AUTO: 29.9 PG (ref 25.7–33.7)
MCHC RBC AUTO-ENTMCNC: 33 G/DL (ref 32–35.9)
MCV RBC: 90.6 FL (ref 80–96)
MONOCYTES # BLD AUTO: 4.6 % (ref 3.8–10.2)
NEUTROPHILS # BLD: 82.4 % (ref 42.8–82.8)
PLATELET # BLD AUTO: 243 K/MM3 (ref 134–434)
PMV BLD: 10 FL (ref 7.5–11.1)
POTASSIUM SERPLBLD-SCNC: 4.3 MMOL/L (ref 3.5–5.1)
PROT SERPL-MCNC: 8.2 G/DL (ref 6.4–8.2)
RBC # BLD AUTO: 4.9 M/MM3 (ref 4–5.6)
SODIUM SERPL-SCNC: 140 MMOL/L (ref 136–145)
WBC # BLD AUTO: 18.3 K/MM3 (ref 4–10)

## 2018-10-07 PROCEDURE — 3E0337Z INTRODUCTION OF ELECTROLYTIC AND WATER BALANCE SUBSTANCE INTO PERIPHERAL VEIN, PERCUTANEOUS APPROACH: ICD-10-PCS

## 2018-10-07 PROCEDURE — 3E033GC INTRODUCTION OF OTHER THERAPEUTIC SUBSTANCE INTO PERIPHERAL VEIN, PERCUTANEOUS APPROACH: ICD-10-PCS

## 2018-10-07 NOTE — PDOC
Attending Attestation





- Resident


Resident Name: Jameel Patten





- ED Attending Attestation


I have performed the following: I have examined & evaluated the patient, The 

case was reviewed & discussed with the resident, I agree w/resident's findings 

& plan





- HPI


HPI: 





10/07/18 16:19





Mr. Harvey is a 23 year old male, with a signfiicant past medical history of 

asthma and appendectomy in 2017, cannibis use, who presents to the ED 

complaining of nausea and vomiting since last night. He reports that he 

consumed alcohol last night around 10pm. He notes that he drank [place] last 

night.  He notes that he has had 10 vomiting episodes since, nonbloody and 

nonbilious. He also reports loose stools associated with his chieg complaint. 

Non bloody in nature. The patient denies marijuana use. The patient has been to 

the ED numerous times in the past year and a half for similar complaints and 

has had an EGD performed before in 11/2017, which revealed Mild Gastritis and 

Duodenal inflammation. 


The patient denies chest pain, shortness of breath, headache and dizziness. 

Denies fever, chills, constipation. Denies dysuria, frequency, urgency and 

hematuria. 





Allergies: Cefaclor


Past surgical history: Appendectomy, tumor removal (2008)


Social History: Alcohol use. 











- Physicial Exam


PE: 





10/07/18 15:40





poorly cooperative, malaised appearing, PERRL, no pinpoint pupils, EOMI, no 

nystagmus. MMM, nl conjunctiva, anicteric; neck supple. lungs clear, RRR, 

abdomen soft diffusely tender, nondistended. No CVAT. GARCIA x4, no focal neuro 

deficits. No peripheral edema. normal color for ethnicity, WWP.





10/07/18 16:00








- Medical Decision Making





10/07/18 16:20





23 YOM with asthma, appendectomy, cannibis use presenting with diffuse AP, n/v, 

s/p ETOH use last night.


+history of cannibis use.





DDx abdominal pain:  Renal colic, biliary colic, GERD, PUD, esophageal spasm, 

pancreatitis, hepatitis, colitis, gastroenteritis, cholecystitis, UTI, 

pyelonephritis, ileus, SBO, medication side effect, hernia, diverticulitis, 

cannibis induced cyclic vomiting


given pepcid, IVF, zofran, reglan with minimal improvement. Abdomen 

nonperitoneal, but diffusely tender





vitals wnl. no fever


labs and lytes with leukocytosis of 18K, higher than previously


has ED visits for n/v multiple times


has been ambulating in the department, min distress


ETOH level mildly elevated, but not clinically intoxicated.


CT a/p to r/o intra abd pathology, diffuse tenderness with n/v. also with high 

WBC ct, occult infection/inflammation that needs further discernment. 





pt eloped at 5pm, unable to find in the department. did not get CT scan, unable 

to reach. per records, there is history of elopement.





10/07/18 17:32





10/07/18 18:46

## 2018-10-07 NOTE — PDOC
History of Present Illness





- History of Present Illness


Initial Comments: 





24 yo M w a hx of asthma and appendectomy is here acting very dramatic, has 7 

episodes of vomiting and nausea since this AM. Vomitus is yellow - NBNB. Denies 

any abdominal pain, not cooperative during interview. Was drinking last night 

and thinks he got alcohol poisoning. Had 5 cups of hennesee. He's had non-stop 

nausea and vomiting since awaking this morning. He also has diarrhea and loose 

stools - nb. He specifically asked to get zofran and IVF. Has a hx of coming 

here for same complaint. Denies marijuana usage. Denies f/c. denies abdominal 

pain, dysuria. No sick contacts. Denies SOB, or difficulty breathing. Denies 

chest pain. 





Social hx: drinks alcohol regularly. Denies using cigarettes or other illicit 

drugs. 


Pcp: Does not have one


Allergies: Cefaclor














<Jameel Patten - Last Filed: 10/07/18 18:13>





<Kristen Barriga - Last Filed: 10/07/18 18:47>





- General


Chief Complaint: Nausea/Vomiting


Stated Complaint: NAUSEA/VOMITING


Time Seen by Provider: 10/07/18 13:33





Past History





- Past Medical History


Asthma: Yes


CVA: No


COPD: No


CHF: No


Thyroid Disease: No





- Surgical History


Abdominal Surgery: Yes


Appendectomy: Yes


Neurologic Surgery: Yes (Tumor Removal 2008)





- Immunization History


Immunization Up to Date: No





- Suicide/Smoking/Psychosocial Hx


Smoking History: Never smoked


Have you smoked in the past 12 months: No


Information on smoking cessation initiated: No


Hx Alcohol Use: Yes (social)


Drug/Substance Use Hx: No


Substance Use Type: Alcohol





<Jameel Patten - Last Filed: 10/07/18 18:13>





<Kristen Barriga - Last Filed: 10/07/18 18:47>





- Past Medical History


Allergies/Adverse Reactions: 


 Allergies











Allergy/AdvReac Type Severity Reaction Status Date / Time


 


cefaclor [From [facility]] Allergy Severe "throat Verified 08/16/18 18:04





   swelling"  











Home Medications: 


Ambulatory Orders





NK [No Known Home Medication]  05/10/18 











**Review of Systems





- Review of Systems


Able to Perform ROS?: Yes


Constitutional: Yes: Chills, Loss of Appetite, Malaise, Weakness


HEENTM: No: Blurred Vision, Nose Congestion, Throat Pain, Difficulty Swallowing


Cardiac (ROS): No: Chest Pain, Irregular Heart Rate, Lightheadedness, 

Palpitations


ABD/GI: Yes: Abdominal Distended, Diarrhea, Nausea, Poor Appetite, Poor Fluid 

Intake, Vomiting, Abdominal cramping.  No: Constipated


: No: Burning, Dysuria, Frequency


Musculoskeletal: No: Back Pain, Joint Pain, Joint Swelling, Muscle Pain, Muscle 

Weakness


Integumentary: No: Bruising, Change in Color, Erythema, Pallor, Pruritus


Neurological: No: Headache, Numbness, Seizure, Unsteady Gait, Dizziness


Psychiatric: Yes: Frequent Crying.  No: Anxiety, Depression


Endocrine: No: Excessive Sweating, Intolerance to Cold, Intolerance to Heat


Hematologic/Lymphatic: No: Anemia, Blood Clots, Easy Bruising





<Jameel Patten - Last Filed: 10/07/18 18:13>





*Physical Exam





- Vital Signs


 Last Vital Signs











Temp Pulse Resp BP Pulse Ox


 


 98.2 F   62   17   112/70   100 


 


 10/07/18 13:24  10/07/18 13:24  10/07/18 13:24  10/07/18 13:24  10/07/18 13:24














- Physical Exam


General Appearance: Yes: Nourished, Appropriately Dressed, Moderate Distress, 

Thin


HEENT: positive: EOMI, STACIE, Normal ENT Inspection, Normal Voice.  negative: 

Nasal Congestion


Neck: positive: Trachea midline, Supple.  negative: Decreased range of motion, 

Lymphadenopathy (R), Lymphadenopathy (L)


Respiratory/Chest: positive: Lungs Clear, Normal Breath Sounds.  negative: 

Respiratory Distress, Crackles


Cardiovascular: positive: Regular Rhythm, Regular Rate, S1, S2.  negative: Edema

, JVD, Murmur


Vascular Pulses: Dorsalis-Pedis (R): 2+, Doralis-Pedis (L): 2+


Gastrointestinal/Abdominal: positive: Normal Bowel Sounds, Tender, Flat, 

Tenderness.  negative: Organomegaly, Increased Bowel Sounds, Protuberent, 

Distended, Guarding, Rebound, Hepatomegaly


Lymphatic: negative: Adenopathy


Musculoskeletal: positive: Normal Inspection.  negative: CVA Tenderness, 

Decreased Range of Motion, Vertebral Tenderness


Extremity: positive: Normal Capillary Refill, Normal Inspection, Normal Range 

of Motion.  negative: Delayed Capillary Refill


Integumentary: positive: Normal Color, Dry, Warm.  negative: Cyanotic, Erythema

, Jaundice, Rash, Ecchymosis, Bruising


Neurologic: positive: CNs II-XII NML intact, Fully Oriented, Alert.  negative: 

Normal Mood/Affect





<Jameel Patten - Last Filed: 10/07/18 18:13>





- Vital Signs


 Last Vital Signs











Temp Pulse Resp BP Pulse Ox


 


 98.2 F   62   17   112/70   100 


 


 10/07/18 13:24  10/07/18 13:24  10/07/18 13:24  10/07/18 13:24  10/07/18 13:24














<BarrigaKristen Jamila - Last Filed: 10/07/18 18:47>





ED Treatment Course





- LABORATORY


CBC & Chemistry Diagram: 


 10/07/18 14:11





 10/07/18 14:11





<Jameel Patten - Last Filed: 10/07/18 18:13>





- LABORATORY


CBC & Chemistry Diagram: 


 10/07/18 14:11





 10/07/18 14:11





- ADDITIONAL ORDERS


Additional order review: 


 Laboratory  Results











  10/07/18 10/07/18





  14:11 14:11


 


Sodium   140


 


Potassium   4.3


 


Chloride   105


 


Carbon Dioxide   26


 


Anion Gap   8


 


BUN   7


 


Creatinine   1.0


 


Creat Clearance w eGFR   > 60


 


Random Glucose   98


 


Calcium   9.3


 


Total Bilirubin   0.4


 


AST   30


 


ALT   31


 


Alkaline Phosphatase   63


 


Total Protein   8.2


 


Albumin   4.6


 


Lipase   75


 


Alcohol, Quantitative  11.5 H 








 











  10/07/18





  14:11


 


RBC  4.90


 


MCV  90.6


 


MCHC  33.0


 


RDW  14.2


 


MPV  10.0


 


Neutrophils %  82.4


 


Lymphocytes %  11.7  D


 


Monocytes %  4.6


 


Eosinophils %  0.6


 


Basophils %  0.7  D














- RADIOLOGY


Radiology Studies Ordered: 














 Category Date Time Status


 


 ABDOMEN & PELVIS CT WITH CONTR [CT] Stat CT Scan  10/07/18 15:41 Ordered














- Medications


Given in the ED: 


ED Medications














Discontinued Medications














Generic Name Dose Route Start Last Admin





  Trade Name Freq  PRN Reason Stop Dose Admin


 


Famotidine/Sodium Chloride  20 mg in 50 mls @ 100 mls/hr  10/07/18 13:40  10/07/

18 14:15





  Pepcid 20 Mg Premixed Ivpb -  IVPB  10/07/18 14:09  100 mls/hr





  ONCE ONE   Administration





     





     





     





     


 


Sodium Chloride  1,000 mls @ 1,000 mls/hr  10/07/18 13:40  10/07/18 14:05





  Normal Saline -  IV  10/07/18 14:39  1,000 mls/hr





  ASDIR STA   Administration





     





     





     





     


 


Metoclopramide HCl  10 mg  10/07/18 15:20  10/07/18 15:39





  Reglan Injection -  IVPUSH  10/07/18 15:21  10 mg





  ONCE ONE   Administration





     





     





     





     


 


Metoclopramide HCl  10 mg  10/07/18 15:38  10/07/18 15:40





  Reglan Injection -  IVPUSH  10/07/18 15:39  Not Given





  ONCE ONE   





     





     





     





     


 


Ondansetron HCl  4 mg  10/07/18 13:40  10/07/18 14:00





  Zofran Injection  IVPUSH  10/07/18 13:41  4 mg





  ONCE ONE   Administration





     





     





     





     


 


Sodium Chloride  1,000 ml  10/07/18 15:07  10/07/18 15:23





  Normal Saline -  IV  10/07/18 15:08  1,000 ml





  ONCE ONE   Administration





     





     





     





     














<Kristen Barriga - Last Filed: 10/07/18 18:47>





Medical Decision Making





- Medical Decision Making





24 yo M w a hx of asthma and appendectomy is here acting very dramatic, has 7 

episodes of vomiting and nausea since this AM. 


vomit is yellow. 





DD includes but not limited to: Alcohol poisoning, pancreatitis, food poisening

, gastroenteritis





Plan: Cbc, Cmp, lipase, Zofran, IVF - NS, Pepcid, re-assess. 





Cbc remarkable for WBC of 18


We believe this is a result of cyclic vomiting. 





Patient eloped before further medical evaluation, conclusion and diagnosis 

could be completed. 





<Jameel Patten - Last Filed: 10/07/18 18:13>





*DC/Admit/Observation/Transfer





- Discharge Dispostion


Decision to Admit order: No





<Jameel Patten - Last Filed: 10/07/18 18:13>





<Kristen Barriga - Last Filed: 10/07/18 18:47>


Diagnosis at time of Disposition: 


 Nausea & vomiting








- Discharge Dispostion


Disposition: ELOPED


Condition at time of disposition: Stable





- Referrals


Referrals: 


Jesus Harrington MD [Staff Physician] - 





- Patient Instructions


Printed Discharge Instructions:  DI for Diarrhea and Traveler's Diarrhea -- 

Adult, DI for Nausea -- Adult, DI for Vomiting -- Adult


Additional Instructions: 


YOU CAME TO THE ER BUT ELOPED BEFORE MEDICAL EVALUATION AND CONCLUSIONS COULD 

BE COMPLETED. 


PLEASE COME BACK TO THE ER SO YOU CAN BE EVALUATED. 


Print Language: ENGLISH

## 2019-01-07 ENCOUNTER — HOSPITAL ENCOUNTER (EMERGENCY)
Dept: HOSPITAL 74 - JER | Age: 24
Discharge: HOME | End: 2019-01-07
Payer: COMMERCIAL

## 2019-01-07 VITALS — BODY MASS INDEX: 18.4 KG/M2

## 2019-01-07 VITALS — HEART RATE: 82 BPM | DIASTOLIC BLOOD PRESSURE: 84 MMHG | SYSTOLIC BLOOD PRESSURE: 138 MMHG

## 2019-01-07 VITALS — TEMPERATURE: 98.8 F

## 2019-01-07 DIAGNOSIS — K52.9: ICD-10-CM

## 2019-01-07 DIAGNOSIS — G43.A0: Primary | ICD-10-CM

## 2019-01-07 LAB
ALBUMIN SERPL-MCNC: 5.1 G/DL (ref 3.4–5)
ALP SERPL-CCNC: 70 U/L (ref 45–117)
ALT SERPL-CCNC: 31 U/L (ref 13–61)
AMYLASE SERPL-CCNC: 85 U/L (ref 25–115)
ANION GAP SERPL CALC-SCNC: 8 MMOL/L (ref 8–16)
AST SERPL-CCNC: 18 U/L (ref 15–37)
BASOPHILS # BLD: 0.1 % (ref 0–2)
BILIRUB SERPL-MCNC: 1.1 MG/DL (ref 0.2–1)
BUN SERPL-MCNC: 12 MG/DL (ref 7–18)
CALCIUM SERPL-MCNC: 10.2 MG/DL (ref 8.5–10.1)
CHLORIDE SERPL-SCNC: 96 MMOL/L (ref 98–107)
CO2 SERPL-SCNC: 29 MMOL/L (ref 21–32)
CREAT SERPL-MCNC: 1.1 MG/DL (ref 0.55–1.3)
DEPRECATED RDW RBC AUTO: 13.9 % (ref 11.9–15.9)
EOSINOPHIL # BLD: 0.2 % (ref 0–4.5)
GLUCOSE SERPL-MCNC: 104 MG/DL (ref 74–106)
HCT VFR BLD CALC: 47.3 % (ref 35.4–49)
HGB BLD-MCNC: 16.6 GM/DL (ref 11.7–16.9)
LIPASE SERPL-CCNC: 135 U/L (ref 73–393)
LYMPHOCYTES # BLD: 13.8 % (ref 8–40)
MCH RBC QN AUTO: 31.6 PG (ref 25.7–33.7)
MCHC RBC AUTO-ENTMCNC: 35.2 G/DL (ref 32–35.9)
MCV RBC: 89.7 FL (ref 80–96)
MONOCYTES # BLD AUTO: 8.4 % (ref 3.8–10.2)
NEUTROPHILS # BLD: 77.5 % (ref 42.8–82.8)
PLATELET # BLD AUTO: 269 K/MM3 (ref 134–434)
PMV BLD: 10.5 FL (ref 7.5–11.1)
POTASSIUM SERPLBLD-SCNC: 3.9 MMOL/L (ref 3.5–5.1)
PROT SERPL-MCNC: 9 G/DL (ref 6.4–8.2)
RBC # BLD AUTO: 5.27 M/MM3 (ref 4–5.6)
SODIUM SERPL-SCNC: 134 MMOL/L (ref 136–145)
WBC # BLD AUTO: 15.4 K/MM3 (ref 4–10)

## 2019-01-07 PROCEDURE — 3E0337Z INTRODUCTION OF ELECTROLYTIC AND WATER BALANCE SUBSTANCE INTO PERIPHERAL VEIN, PERCUTANEOUS APPROACH: ICD-10-PCS | Performed by: EMERGENCY MEDICINE

## 2019-01-07 PROCEDURE — 3E033GC INTRODUCTION OF OTHER THERAPEUTIC SUBSTANCE INTO PERIPHERAL VEIN, PERCUTANEOUS APPROACH: ICD-10-PCS | Performed by: EMERGENCY MEDICINE

## 2019-01-07 NOTE — PDOC
History of Present Illness





- General


Chief Complaint: Nausea


Stated Complaint: NAUSEA


Time Seen by Provider: 01/07/19 03:58





Past History





- Past Medical History


Allergies/Adverse Reactions: 


 Allergies











Allergy/AdvReac Type Severity Reaction Status Date / Time


 


cefaclor [From Ceclor] Allergy Severe "throat Verified 08/16/18 18:04





   swelling"  











Home Medications: 


Ambulatory Orders





NK [No Known Home Medication]  05/10/18 








Asthma: Yes


CVA: No


COPD: No


CHF: No


Thyroid Disease: No





- Surgical History


Abdominal Surgery: Yes


Appendectomy: Yes


Neurologic Surgery: Yes (Tumor Removal 2008)





- Immunization History


Immunization Up to Date: No





- Suicide/Smoking/Psychosocial Hx


Smoking History: Never smoked


Have you smoked in the past 12 months: No


Hx Alcohol Use: Yes (social)


Drug/Substance Use Hx: No


Substance Use Type: Alcohol





*Physical Exam





- Vital Signs


 Last Vital Signs











Temp Pulse Resp BP Pulse Ox


 


 98.9 F   82   19   138/84   100 


 


 01/07/19 03:20  01/07/19 03:20  01/07/19 03:20  01/07/19 03:20  01/07/19 03:20














Moderate Sedation





- Procedure Monitoring


Vital Signs: 


Procedure Monitoring Vital Signs











Temperature  98.9 F   01/07/19 03:20


 


Pulse Rate  82   01/07/19 03:20


 


Respiratory Rate  19   01/07/19 03:20


 


Blood Pressure  138/84   01/07/19 03:20


 


O2 Sat by Pulse Oximetry (%)  100   01/07/19 03:20











*DC/Admit/Observation/Transfer





- Referrals


Referrals: 


Christina Rojas MD [Primary Care Provider] - 





- Patient Instructions





- Post Discharge Activity

## 2019-01-07 NOTE — PDOC
History of Present Illness





- General


Chief Complaint: Nausea


Stated Complaint: NAUSEA


Time Seen by Provider: 01/07/19 03:58





Past History





- Past Medical History


Allergies/Adverse Reactions: 


 Allergies











Allergy/AdvReac Type Severity Reaction Status Date / Time


 


cefaclor [From Ceclor] Allergy Severe "throat Verified 08/16/18 18:04





   swelling"  











Home Medications: 


Ambulatory Orders





NK [No Known Home Medication]  05/10/18 








Asthma: Yes


CVA: No


COPD: No


CHF: No


Thyroid Disease: No





- Surgical History


Abdominal Surgery: Yes


Appendectomy: Yes


Neurologic Surgery: Yes (Tumor Removal 2008)





- Immunization History


Immunization Up to Date: No





- Suicide/Smoking/Psychosocial Hx


Smoking History: Never smoked


Have you smoked in the past 12 months: No


Hx Alcohol Use: Yes (social)


Drug/Substance Use Hx: No


Substance Use Type: Alcohol





*Physical Exam





- Vital Signs


 Last Vital Signs











Temp Pulse Resp BP Pulse Ox


 


 98.9 F   82   19   138/84   100 


 


 01/07/19 03:20  01/07/19 03:20  01/07/19 03:20  01/07/19 03:20  01/07/19 03:20














Moderate Sedation





- Procedure Monitoring


Vital Signs: 


Procedure Monitoring Vital Signs











Temperature  98.9 F   01/07/19 03:20


 


Pulse Rate  82   01/07/19 03:20


 


Respiratory Rate  19   01/07/19 03:20


 


Blood Pressure  138/84   01/07/19 03:20


 


O2 Sat by Pulse Oximetry (%)  100   01/07/19 03:20











ED Treatment Course





- LABORATORY


CBC & Chemistry Diagram: 


 01/07/19 04:16





 01/07/19 04:16





*DC/Admit/Observation/Transfer


Diagnosis at time of Disposition: 


 Cyclical vomiting syndrome, Gastroenteritis








- Discharge Dispostion


Disposition: HOME


Condition at time of disposition: Improved


Decision to Admit order: No





- Referrals


Referrals: 


Christina Rojas MD [Primary Care Provider] - 


Urbano Mcdonald MD [Staff Physician] - 





- Patient Instructions


Printed Discharge Instructions:  Gastroenteritis Diet





- Post Discharge Activity

## 2021-07-30 ENCOUNTER — HOSPITAL ENCOUNTER (EMERGENCY)
Dept: HOSPITAL 74 - JER | Age: 26
Discharge: LEFT BEFORE DISCHARGE | End: 2021-07-30
Payer: COMMERCIAL

## 2021-07-30 VITALS — SYSTOLIC BLOOD PRESSURE: 111 MMHG | DIASTOLIC BLOOD PRESSURE: 65 MMHG | TEMPERATURE: 98.1 F | HEART RATE: 77 BPM

## 2021-07-30 VITALS — BODY MASS INDEX: 17.1 KG/M2

## 2021-07-30 DIAGNOSIS — F22: Primary | ICD-10-CM

## 2025-06-08 ENCOUNTER — HOSPITAL ENCOUNTER (INPATIENT)
Dept: HOSPITAL 74 - JER | Age: 30
LOS: 1 days | Discharge: LEFT BEFORE DISCHARGE | DRG: 249 | End: 2025-06-09
Attending: INTERNAL MEDICINE
Payer: COMMERCIAL

## 2025-06-08 VITALS — BODY MASS INDEX: 19.3 KG/M2

## 2025-06-08 DIAGNOSIS — J45.909: ICD-10-CM

## 2025-06-08 DIAGNOSIS — E86.0: ICD-10-CM

## 2025-06-08 DIAGNOSIS — R11.15: Primary | ICD-10-CM

## 2025-06-08 DIAGNOSIS — F12.988: ICD-10-CM

## 2025-06-08 DIAGNOSIS — R00.1: ICD-10-CM

## 2025-06-08 DIAGNOSIS — D72.829: ICD-10-CM

## 2025-06-08 LAB
ALBUMIN SERPL-MCNC: 4.2 G/DL (ref 3.4–5)
ALP SERPL-CCNC: 64 U/L (ref 45–117)
ALT SERPL-CCNC: 19 U/L (ref 13–61)
ANION GAP SERPL CALC-SCNC: 8 MMOL/L (ref 4–13)
AST SERPL-CCNC: 25 U/L (ref 15–37)
BASOPHILS # BLD AUTO: 0.03 X10^3/UL (ref 0.01–0.08)
BILIRUB SERPL-MCNC: 0.9 MG/DL (ref 0.2–1)
BUN SERPL-MCNC: 10.4 MG/DL (ref 7–18)
CALCIUM SERPL-MCNC: 9.8 MG/DL (ref 8.5–10.1)
CHLORIDE SERPL-SCNC: 102 MMOL/L (ref 98–107)
CO2 SERPL-SCNC: 27 MMOL/L (ref 21–32)
CREAT SERPL-MCNC: 0.9 MG/DL (ref 0.55–1.3)
EOSINOPHIL # BLD AUTO: 0.02 X10^3/UL (ref 0.04–0.54)
EOSINOPHIL NFR BLD AUTO: 0.1 % (ref 0.8–7)
ERYTHROCYTE [DISTWIDTH] IN BLOOD: 13.5 % (ref 11.9–15.3)
GLUCOSE SERPL-MCNC: 110 MG/DL (ref 74–106)
HCT VFR BLD CALC: 40.6 % (ref 40.1–51)
HGB BLD-MCNC: 14 G/DL (ref 13.7–17.5)
IMM GRANULOCYTES # BLD: 0.06 X10^3/UL (ref 0–0.03)
MAGNESIUM SERPL-MCNC: 2.5 MG/DL (ref 1.8–2.4)
MCHC RBC-ENTMCNC: 34.5 G/DL (ref 32.3–36.5)
MCV RBC: 90 FL (ref 79–92.2)
MONOCYTES # BLD AUTO: 1.21 X10^3/UL (ref 0.3–0.82)
MONOCYTES NFR BLD AUTO: 7.9 % (ref 5.3–12.2)
PLATELET # BLD AUTO: 270 X10^3/UL (ref 163–337)
PLATELETS.RETICULATED NFR BLD AUTO: (no result) % (ref 0.9–11.2)
PLATELETS.RETICULATED NFR BLD AUTO: (no result) X10^3/UL
PMV BLD: 11 FL (ref 9.4–12.4)
POTASSIUM SERPLBLD-SCNC: 3.6 MMOL/L (ref 3.5–5.1)
PROT SERPL-MCNC: 7.4 G/DL (ref 6.4–8.2)
SODIUM SERPL-SCNC: 137 MMOL/L (ref 136–145)

## 2025-06-08 PROCEDURE — G0378 HOSPITAL OBSERVATION PER HR: HCPCS

## 2025-06-08 RX ADMIN — DIAZEPAM ONE: 5 INJECTION, SOLUTION INTRAMUSCULAR; INTRAVENOUS at 19:56

## 2025-06-08 RX ADMIN — SODIUM CHLORIDE ONE: 9 INJECTION, SOLUTION INTRAVENOUS at 19:31

## 2025-06-08 RX ADMIN — Medication PRN MG: at 22:55

## 2025-06-08 RX ADMIN — HALOPERIDOL LACTATE ONE MG: 5 INJECTION, SOLUTION INTRAMUSCULAR at 08:35

## 2025-06-08 RX ADMIN — HALOPERIDOL LACTATE ONE MG: 5 INJECTION, SOLUTION INTRAMUSCULAR at 14:01

## 2025-06-08 RX ADMIN — ONDANSETRON ONE MG: 2 INJECTION INTRAMUSCULAR; INTRAVENOUS at 14:01

## 2025-06-08 RX ADMIN — ONDANSETRON ONE: 2 INJECTION INTRAMUSCULAR; INTRAVENOUS at 19:26

## 2025-06-08 RX ADMIN — FAMOTIDINE ONE MLS/HR: 20 INJECTION, SOLUTION INTRAVENOUS at 19:51

## 2025-06-08 RX ADMIN — ONDANSETRON PRN MG: 2 INJECTION INTRAMUSCULAR; INTRAVENOUS at 22:55

## 2025-06-08 RX ADMIN — SODIUM CHLORIDE, POTASSIUM CHLORIDE, SODIUM LACTATE AND CALCIUM CHLORIDE STA MLS/HR: 600; 310; 30; 20 INJECTION, SOLUTION INTRAVENOUS at 19:51

## 2025-06-08 RX ADMIN — SODIUM CHLORIDE ONE ML: 9 INJECTION, SOLUTION INTRAVENOUS at 08:35

## 2025-06-08 RX ADMIN — ONDANSETRON ONE MG: 2 INJECTION INTRAMUSCULAR; INTRAVENOUS at 11:35

## 2025-06-09 VITALS — DIASTOLIC BLOOD PRESSURE: 77 MMHG | SYSTOLIC BLOOD PRESSURE: 123 MMHG | HEART RATE: 90 BPM | TEMPERATURE: 98.2 F

## 2025-06-09 VITALS — RESPIRATION RATE: 18 BRPM

## 2025-06-09 LAB
AMPHET UR-MCNC: NEGATIVE NG/ML
ANION GAP SERPL CALC-SCNC: 10 MMOL/L (ref 4–13)
BARBITURATES UR-MCNC: NEGATIVE UG/ML
BASOPHILS # BLD AUTO: 0.03 X10^3/UL (ref 0.01–0.08)
BENZODIAZ UR SCN-MCNC: NEGATIVE NG/ML
BUN SERPL-MCNC: 8.9 MG/DL (ref 7–18)
CALCIUM SERPL-MCNC: 9.2 MG/DL (ref 8.5–10.1)
CHLORIDE SERPL-SCNC: 101 MMOL/L (ref 98–107)
CO2 SERPL-SCNC: 26 MMOL/L (ref 21–32)
COCAINE UR-MCNC: NEGATIVE NG/ML
CREAT SERPL-MCNC: 0.8 MG/DL (ref 0.55–1.3)
EOSINOPHIL # BLD AUTO: 0.05 X10^3/UL (ref 0.04–0.54)
EOSINOPHIL NFR BLD AUTO: 0.5 % (ref 0.8–7)
ERYTHROCYTE [DISTWIDTH] IN BLOOD: 13.3 % (ref 11.9–15.3)
GLUCOSE SERPL-MCNC: 93 MG/DL (ref 74–106)
HCT VFR BLD CALC: 40.4 % (ref 40.1–51)
HGB BLD-MCNC: 13.7 G/DL (ref 13.7–17.5)
IMM GRANULOCYTES # BLD: 0.07 X10^3/UL (ref 0–0.03)
MCHC RBC-ENTMCNC: 33.9 G/DL (ref 32.3–36.5)
MCV RBC: 90 FL (ref 79–92.2)
METHADONE UR-MCNC: NEGATIVE UG/L
MONOCYTES # BLD AUTO: 0.93 X10^3/UL (ref 0.3–0.82)
MONOCYTES NFR BLD AUTO: 8.5 % (ref 5.3–12.2)
OPIATES UR QL SCN: NEGATIVE
PCP UR QL SCN: NEGATIVE
PHOSPHATE SERPL-MCNC: 3.5 MG/DL (ref 2.5–4.9)
PLATELET # BLD AUTO: 260 X10^3/UL (ref 163–337)
PLATELETS.RETICULATED NFR BLD AUTO: (no result) % (ref 0.9–11.2)
PLATELETS.RETICULATED NFR BLD AUTO: (no result) X10^3/UL
PMV BLD: 10.8 FL (ref 9.4–12.4)
POTASSIUM SERPLBLD-SCNC: 3.6 MMOL/L (ref 3.5–5.1)
SODIUM SERPL-SCNC: 138 MMOL/L (ref 136–145)

## 2025-06-09 RX ADMIN — ANALGESIC BALM SCH APPLIC: 1.74; 4.06 OINTMENT TOPICAL at 18:16

## 2025-06-09 RX ADMIN — SODIUM CHLORIDE SCH MLS/HR: 9 INJECTION, SOLUTION INTRAVENOUS at 18:15

## 2025-06-09 RX ADMIN — ONDANSETRON SCH: 2 INJECTION INTRAMUSCULAR; INTRAVENOUS at 20:52

## 2025-06-11 ENCOUNTER — HOSPITAL ENCOUNTER (EMERGENCY)
Dept: HOSPITAL 74 - JER | Age: 30
Discharge: HOME | End: 2025-06-11
Payer: COMMERCIAL

## 2025-06-11 VITALS
SYSTOLIC BLOOD PRESSURE: 140 MMHG | HEART RATE: 82 BPM | RESPIRATION RATE: 16 BRPM | TEMPERATURE: 98.1 F | DIASTOLIC BLOOD PRESSURE: 82 MMHG

## 2025-06-11 VITALS — BODY MASS INDEX: 21.1 KG/M2

## 2025-06-11 DIAGNOSIS — R11.2: ICD-10-CM

## 2025-06-11 DIAGNOSIS — F12.188: Primary | ICD-10-CM

## 2025-06-11 PROCEDURE — 3E033GC INTRODUCTION OF OTHER THERAPEUTIC SUBSTANCE INTO PERIPHERAL VEIN, PERCUTANEOUS APPROACH: ICD-10-PCS | Performed by: STUDENT IN AN ORGANIZED HEALTH CARE EDUCATION/TRAINING PROGRAM

## 2025-06-11 RX ADMIN — FAMOTIDINE ONE: 20 INJECTION, SOLUTION INTRAVENOUS at 13:13

## 2025-06-11 RX ADMIN — SODIUM CHLORIDE STA: 9 INJECTION, SOLUTION INTRAVENOUS at 13:13

## 2025-06-11 RX ADMIN — HALOPERIDOL LACTATE ONE MG: 5 INJECTION, SOLUTION INTRAMUSCULAR at 13:13
